# Patient Record
Sex: MALE | Race: WHITE | Employment: OTHER | URBAN - METROPOLITAN AREA
[De-identification: names, ages, dates, MRNs, and addresses within clinical notes are randomized per-mention and may not be internally consistent; named-entity substitution may affect disease eponyms.]

---

## 2022-02-16 ENCOUNTER — OFFICE VISIT (OUTPATIENT)
Dept: ORTHOPEDIC SURGERY | Age: 72
End: 2022-02-16
Payer: MEDICARE

## 2022-02-16 VITALS
WEIGHT: 232 LBS | RESPIRATION RATE: 16 BRPM | BODY MASS INDEX: 31.42 KG/M2 | OXYGEN SATURATION: 97 % | HEIGHT: 72 IN | HEART RATE: 75 BPM

## 2022-02-16 DIAGNOSIS — M72.2 PLANTAR FASCIITIS: Primary | ICD-10-CM

## 2022-02-16 PROCEDURE — 99203 OFFICE O/P NEW LOW 30 MIN: CPT | Performed by: STUDENT IN AN ORGANIZED HEALTH CARE EDUCATION/TRAINING PROGRAM

## 2022-02-16 ASSESSMENT — ENCOUNTER SYMPTOMS
VOMITING: 0
DIARRHEA: 0
SHORTNESS OF BREATH: 0
SORE THROAT: 0
COLOR CHANGE: 0
WHEEZING: 0
BACK PAIN: 0
NAUSEA: 0
COUGH: 0

## 2022-02-16 NOTE — PROGRESS NOTES
Patient is a 70year old male. Patient is in the office today with right foot/heel pain. Pain scale  1-2/10 currently but pain increases to 5/10 with walking. His pain is located on the medial arch into the medial side of his heel. He has tried shoe inserts to help with the pain but doesn't think it helped. He denies any falls or injuries but states that he was walking 2 miles about 5 days a week. He started having pain about 1 month ago. He states that he previously has plantar fasciitis about 20 years ago. He denies any previous surgeries or injections.    Occupation: retired

## 2022-02-16 NOTE — PATIENT INSTRUCTIONS
Prescription for night splint given. Physical therapy ordered today. Home exercises also given  Continue to wt bear as tolerated. Follow up in 2 months     Plantar Fasciitis: Exercises  Introduction  Here are some examples of exercises for you to try. The exercises may be suggested for a condition or for rehabilitation. Start each exercise slowly. Ease off the exercises if you start to have pain. You will be told when to start these exercises and which ones will work best for you. How to do the exercises  Towel stretch    1. Sit with your legs extended and knees straight. 2. Place a towel around your foot just under the toes. 3. Hold each end of the towel in each hand, with your hands above your knees. 4. Pull back with the towel so that your foot stretches toward you. 5. Hold the position for at least 15 to 30 seconds. 6. Repeat 2 to 4 times a session, up to 5 sessions a day. Calf stretch    This exercise stretches the muscles at the back of the lower leg (the calf) and the Achilles tendon. Do this exercise 3 or 4 times a day, 5 days a week. 1. Stand facing a wall with your hands on the wall at about eye level. Put the leg you want to stretch about a step behind your other leg. 2. Keeping your back heel on the floor, bend your front knee until you feel a stretch in the back leg. 3. Hold the stretch for 15 to 30 seconds. Repeat 2 to 4 times. Plantar fascia and calf stretch    Stretching the plantar fascia and calf muscles can increase flexibility and decrease heel pain. You can do this exercise several times each day and before and after activity. 1. Stand on a step as shown above. Be sure to hold on to the banister. 2. Slowly let your heels down over the edge of the step as you relax your calf muscles. You should feel a gentle stretch across the bottom of your foot and up the back of your leg to your knee.   3. Hold the stretch about 15 to 30 seconds, and then tighten your calf muscle a little to bring your heel back up to the level of the step. Repeat 2 to 4 times. Towel curls    Make this exercise more challenging by placing a weighted object, such as a soup can, on the other end of the towel. 1. While sitting, place your foot on a towel on the floor and scrunch the towel toward you with your toes. 2. Then, also using your toes, push the towel away from you. [place] pickups    1. Put marbles on the floor next to a cup.  2. Using your toes, try to lift the marbles up from the floor and put them in the cup. Follow-up care is a key part of your treatment and safety. Be sure to make and go to all appointments, and call your doctor if you are having problems. It's also a good idea to know your test results and keep a list of the medicines you take. Where can you learn more? Go to [email or web address] and sign in to your [employer] account. Enter C862 in the [employer] box to learn more about \"Plantar Fasciitis: Exercises. \"     If you do not have an account, please click on the \"Sign Up Now\" link. Current as of: July 1, 2021               Content Version: 13.1  © 2006-2021 Healthwise, Incorporated. Care instructions adapted under license by [facility] ([facility]). If you have questions about a medical condition or this instruction, always ask your healthcare professional. [place] [address] any warranty or liability for your use of this information.

## 2022-02-16 NOTE — PROGRESS NOTES
2/16/2022   Chief Complaint   Patient presents with    Foot Pain     right        History of Present Illness:                             Hi Garcia is a 70 y.o. male  referred by PCP for evaluation and treatment of right foot pain. This is evaluated as a personal injury. Patient states that he does have a history of plantar fasciitis but this was 10 to 20 years ago. He states that this went away with a night splint but states that over the last several weeks he has noticed pain in the plantar fascia of his foot on the right only. He states the pain is worse in the morning with his first steps but does get better as he ambulates on it. He denies any specific injury. He states he is very active and walks several miles a day and multiple days a week. He has no additional complaints at this time including numbness or tingling. The pain's location is right foot plantar fascia. he describes the symptoms as aching, sharp and tight. Symptoms improve with stretching. Symptoms worsen prolonged period of time not weightbearing. Patient states he denies having sensation of instability, decreased ROM    Treatment to date has been ice, NSAID, stretching without significant relief. Patient denies prior injury to foot or ankle, denies numbness, tingling, fever, chills. Is affecting ADLs. Pain is 5/10 at it's worst.    Outside reports reviewed: none. Patient's medications, allergies, past medical, surgical, social and family histories were reviewed and updated as appropriate. MA HPI: Patient is a 70year old male. Patient is in the office today with right foot/heel pain. Pain scale  1-2/10 currently but pain increases to 5/10 with walking. His pain is located on the medial arch into the medial side of his heel. He has tried shoe inserts to help with the pain but doesn't think it helped. He denies any falls or injuries but states that he was walking 2 miles about 5 days a week.  He started having pain about 1 month ago. He states that he previously has plantar fasciitis about 20 years ago. He denies any previous surgeries or injections. Occupation: retired      Medical History  Patient's medications, allergies, past medical, surgical, social and family histories were reviewed and updated as appropriate.     Past Medical History:   Diagnosis Date    Allergic rhinitis     controlled w otc claritin    Allergy to environmental factors     \"use to take allergy shots none for the past 15 yrs'    Asthma     \"few years ago\"    Elevated blood pressure (not hypertension)     \"never been on b/p medication\"    Hearing loss of left ear     \"in the process of getting a hearing aide    History of kidney stones     \"had them twice in the past- last time over 10 yrs ago- passed them\"    Hx MRSA infection     2012    Hyperlipidemia     Left Achilles tendinitis     Nephrolithiasis     early 1980s, passed w/o surgery    S/P colonoscopy 1/2014    Dr Irma Villanueva- severe diverticulosis- recheck 10 yrs     Past Surgical History:   Procedure Laterality Date    ABSCESS DRAINAGE Right 10/12    Dr Paul Man- R thumb MRSA infection     Family History   Problem Relation Age of Onset    Kidney Disease Father         in his 25s    Early Death Father     Heart Disease Mother     Hypertension Mother     Asthma Maternal Uncle     Heart Disease Son         irreg heart beat     Social History     Socioeconomic History    Marital status:      Spouse name: Not on file    Number of children: Not on file    Years of education: Not on file    Highest education level: Not on file   Occupational History    Occupation: [employer]     Comment: as noted   Tobacco Use    Smoking status: Never Smoker    Smokeless tobacco: Never Used   Substance and Sexual Activity    Alcohol use: Yes     Comment: occ- average 2-3 bottles per week of beer    Drug use: No    Sexual activity: Not on file   Other Topics Concern    Not on file Social History Narrative    Not on file     Social Determinants of Health     Financial Resource Strain:     Difficulty of Paying Living Expenses: Not on file   Food Insecurity:     Worried About Running Out of Food in the Last Year: Not on file    Charo of Food in the Last Year: Not on file   Transportation Needs:     Lack of Transportation (Medical): Not on file    Lack of Transportation (Non-Medical): Not on file   Physical Activity:     Days of Exercise per Week: Not on file    Minutes of Exercise per Session: Not on file   Stress:     Feeling of Stress : Not on file   Social Connections:     Frequency of Communication with Friends and Family: Not on file    Frequency of Social Gatherings with Friends and Family: Not on file    Attends Cheondoism Services: Not on file    Active Member of [address] [place] [address] [employer] or Organizations: Not on file    Attends Club or Organization Meetings: Not on file    Marital Status: Not on file   Intimate Partner Violence:     Fear of Current or Ex-Partner: Not on file    Emotionally Abused: Not on file    Physically Abused: Not on file    Sexually Abused: Not on file   Housing Stability:     Unable to Pay for Housing in the Last Year: Not on file    Number of [place] in the Last Year: Not on file    Unstable Housing in the Last Year: Not on file     Current Outpatient Medications   Medication Sig Dispense Refill    naproxen (NAPROSYN) 500 MG tablet Take 1 tablet by mouth 2 times daily (with meals). 60 tablet 1    atorvastatin (LIPITOR) 10 MG tablet TAKE 1 TABLET BY MOUTH ONE TIME A DAY  (Patient not taking: Reported on 2/16/2022) 30 tablet 5     No current facility-administered medications for this visit. Allergies   Allergen Reactions    Pollen Extract          Review of Systems   Constitutional: Positive for activity change. Negative for fatigue and fever. HENT: Negative for hearing loss, sneezing and sore throat.     Respiratory: Negative for cough, shortness of breath and wheezing. Cardiovascular: Negative for chest pain, palpitations and leg swelling. Gastrointestinal: Negative for diarrhea, nausea and vomiting. Musculoskeletal: Positive for gait problem and myalgias. Negative for arthralgias, back pain and joint swelling. Skin: Negative for color change, pallor, rash and wound. Neurological: Negative for weakness, numbness and headaches. Psychiatric/Behavioral: Negative for agitation and confusion. The patient is not hyperactive. Examination:  General Exam:  Vitals: Pulse 75   Resp 16   Ht 6' (1.829 m)   Wt 232 lb (105.2 kg)   SpO2 97%   BMI 31.46 kg/m²    Physical Exam  Constitutional:       General: He is not in acute distress. Appearance: Normal appearance. He is not ill-appearing. Eyes:      General:         Right eye: No discharge. Left eye: No discharge. Extraocular Movements: Extraocular movements intact. Cardiovascular:      Rate and Rhythm: Normal rate. Pulmonary:      Effort: Pulmonary effort is normal. No respiratory distress. Breath sounds: No wheezing. Musculoskeletal:         General: Tenderness present. No swelling, deformity or signs of injury. Right knee: Normal.      Left knee: Normal.      Right lower leg: Normal. No edema. Left lower leg: Normal. No edema. Right ankle: Normal. No swelling, deformity, ecchymosis or lacerations. No tenderness. No lateral malleolus tenderness. Normal range of motion. Anterior drawer test negative. Normal pulse. Right Achilles Tendon: Normal.      Left ankle: Normal.      Left Achilles Tendon: Normal.      Right foot: Normal range of motion and normal capillary refill. Tenderness present. No swelling, deformity, bunion, foot drop, prominent metatarsal heads, laceration, bony tenderness or crepitus. Normal pulse. Left foot: Normal.   Skin:     General: Skin is warm and dry.       Capillary Refill: Capillary refill takes less than 2 seconds. Neurological:      General: No focal deficit present. Mental Status: He is alert and oriented to person, place, and time. Sensory: No sensory deficit. Coordination: Coordination normal.      Gait: Gait normal.   Psychiatric:         Mood and Affect: Mood normal.         Behavior: Behavior normal.        RIGHT Foot and Ankle Exam      INSPECTION: ALIGNMENT:    Gait: Minimally antalgic      Alignment:     Hindfoot: Normal       Midfoot: Normal     Forefoot: Normal    Scars: None   Color: Normal     Swelling: None    Atrophy: None Collective     Heel / Toe Walking: Minimal difficulty     Ankle-Hindfoot Alignment:    Good plantigrade (PG), well aligned      TENDERNESS:    Sinus tarsi: None   Anteromedial joint line: none    Syndesmosis: none   Anterolateral joint line: none    ATFL: Negative   PTFL: Negative      CFL: Negative       Talonavicular: none     Anterior tibialis: none    Anterolateral gutter: none  Extensor tendons: none    Fibula: none    Peroneal tendons: none     Peroneal tubercle.  None     Medial/lateral achilles: none    Medial/lateral achilles insertion: none        Deltoid: none        Medial Malleolus: none   Retrocalcaneal: none    PTT: none    Medial achilles: none    Navicular: none   Lateral achilles: none    Calcaneal tuberosity: none        Calcaneal cuboid: none  MT / MT heads: none     Navicular: none   Medial cord origin PF: none    Cuneiforms: none   Web space: none    Lisfranc none        Base of the fifth metatarsal: none     Plantar fascia: Pain with palpation at calcaneus insertion as well as in the arch        RANGE OF MOTION:  RIGHT   LEFT    STRENGTH: (Right) (* = pain)     Ankle DF/PF:    15/45   15/45    Anterior tibialis: 5/5    Eversion/Inversion:   15/25   15/25   Posterior tibialis: 5/5    Midfoot ABD/ADD:   10/10   10/10   Gastroc-soleus: 5/5    First MTP DF/PF:   60/25 60/25   Peroneals: 5/5    EHL: 5/5            FHL: 5/5      NEUROLOGIC TESTING:    All dermatomes foot, ankle and leg have normal sensation light touch    Ankle Reflexes 2+, symmetric     Negative Babinski and No Clonus      VASCULAR:  2+ pulses PT/DT with brisk capillary refill toes. Diagnostic testing:  X-ray images were reviewed by myself and discussed with the patient:  3 views of the right foot and ankle in a skeletally immature patient demonstrates chronic appearing changes at the plantar fascia and Achilles tendons insertion at the calcaneus. There is calcification at the soft tissue insertions both with osteophyte formation. Minimal osteoarthritic changes throughout the foot and ankle. No sign of any acute osseous or soft tissue abnormalities. Office Procedures:  No orders of the defined types were placed in this encounter. Assessment and Plan    A: Right plantar fasciitis    P:   I had a thorough conversation with the patient regarding his right foot pain. I explained that he has plantar fasciitis like he had before and at this time we will continue conservative measures as he did well with these before. He was given a referral to physical therapy as he would like to be aggressive with this. He was also given home exercise program.  He was given a prescription for a night splint as well. He will continue to use ice and NSAIDs for pain control. He will follow-up in 2 months for reevaluation. All questions were answered and patient voiced understanding.       Electronically signed by Odell Ahumada, DO on 2/16/2022 at 2:52 PM

## 2022-02-24 ENCOUNTER — HOSPITAL ENCOUNTER (OUTPATIENT)
Dept: PHYSICAL THERAPY | Age: 72
Setting detail: THERAPIES SERIES
Discharge: HOME | End: 2022-02-24
Payer: MEDICARE

## 2022-02-24 PROCEDURE — 97161 PT EVAL LOW COMPLEX 20 MIN: CPT

## 2022-02-24 PROCEDURE — 97110 THERAPEUTIC EXERCISES: CPT

## 2022-02-24 ASSESSMENT — PAIN DESCRIPTION - PAIN TYPE: TYPE: ACUTE PAIN

## 2022-02-24 ASSESSMENT — PAIN SCALES - GENERAL: PAINLEVEL_OUTOF10: 1

## 2022-02-24 ASSESSMENT — PAIN DESCRIPTION - ORIENTATION: ORIENTATION: RIGHT

## 2022-02-24 ASSESSMENT — PAIN DESCRIPTION - LOCATION: LOCATION: FOOT

## 2022-02-24 ASSESSMENT — PAIN DESCRIPTION - DESCRIPTORS: DESCRIPTORS: DULL;SORE

## 2022-02-24 ASSESSMENT — PAIN - FUNCTIONAL ASSESSMENT: PAIN_FUNCTIONAL_ASSESSMENT: ACTIVITIES ARE NOT PREVENTED

## 2022-02-24 ASSESSMENT — PAIN DESCRIPTION - PROGRESSION: CLINICAL_PROGRESSION: GRADUALLY IMPROVING

## 2022-02-24 ASSESSMENT — PAIN DESCRIPTION - FREQUENCY: FREQUENCY: INTERMITTENT

## 2022-02-24 NOTE — FLOWSHEET NOTE
Outpatient Physical Therapy  [facility]           [x] Phone: [phone]   Fax: [phone]  [facility]           [] Phone: [phone]   Fax: [phone]        Physical Therapy Daily Treatment Note  Date:  2022    Patient Name:  Wilfredo Bhardwaj    :  1950  MRN: [id number]  Restrictions/Precautions: NONE  Diagnosis:   Diagnosis: plantar fasciitis  Date of Injury/Surgery: --  Treatment Diagnosis: Treatment Diagnosis: Decreased activity tolerance limited by R foot pain    Insurance/Certification information: [facility]   Referring Physician:  Referring Practitioner: Dr. Wilner Naidu  Next Doctor Visit:  --  Plan of care signed (Y/N):  N, sent 22  Outcome Measure: LEFS:  Visit# / total visits:   1/10  Pain level: 1/10   Goals:     Patient goals : improve pain with walking  Short term goals  Time Frame for Short term goals: 5 weeks  Short term goal 1: Pt demo I w/ HEP and symptom management  Short term goal 2: Pt demo LEFS score >50/80 to improve tolerance to ADL's and recreational activities  Short term goal 3: Pt demo >0 deg (neutral) dorsiflexion to improve calf mobility for stair management  Short term goal 4: Pt demo ability to complete >20 SL HR to improve ability to ambulate up/down hills  Short term goal 5: Pt reports R foot pain <2/10 in the morning when rising from bed to improve tolerance to gait activities       Summary of Evaluation:   Pt is 70year old male with gradual onset of R heel/foot pain without injury. Pt c.o of increased pain right when getting out of bed that improves with prolonged gait; states symptoms have slightly improved since initial onset. Pt demo deficits this date that include reduced foot/ankle mobility (most notable gastroc bias DF), limited foot/ankle strength and isolation of muscle groups, limited SL HR ability, tenderness along medial heal and medial longitudinal arch, limitations in great toe mobility.  Pt will benefit with PT services with calf stretching/strengthening, foot/ankle strengthening, balance/proprioception, modalities as needed for symptom management, STM/man to PF/achilles/calf to return to PLOF. Pt prior to onset of current condition had no pain with able to complete full ADLs and work activities. Patient received education on their current pathology and how their condition effects them with their functional activities. Patient understood discussion and questions were answered. Patient understands their activity limitations and understands rational for treatment progression. Subjective:  See reece         Any changes in Ambulatory Summary Sheet? None        Objective:  See eval   COVID screening questions were asked and patient attested that there had been no contact or symptoms        Exercises: (No more than 4 columns)   Exercise/Equipment 2/24/22 #1 Date Date           WARM UP      Nu Step               TABLE      *Seated Great Toe Stretch 20 sec     *TB Eversion                           STANDING      *Standing Calf Stretch w/ towel under R toes 30 sec     *ecc DL HR off of step      DL to SL ecc HR      Toe Walks       Shuttle Press                       PROPRIOCEPTION                                    MODALITIES                      Other Therapeutic Activities/Education:  Patient received education on their current pathology and how their condition effects them with their functional activities. Patient understood discussion and questions were answered. Patient understands their activity limitations and understands rational for treatment progression. Home Exercise Program:  HO issued, reviewed and discussed with patient. Pt agreed to comply.         Manual Treatments:  *try STM/IASTM next session to PF/achielles/calf       Modalities:  --      Communication with other providers:  reece sent 2/24/22      Assessment:  (Response towards treatment session) (Pain Rating)  Pt is 70year old male with gradual onset of R heel/foot pain without injury. Pt c.o of increased pain right when getting out of bed that improves with prolonged gait; states symptoms have slightly improved since initial onset. Pt demo deficits this date that include reduced foot/ankle mobility (most notable gastroc bias DF), limited foot/ankle strength and isolation of muscle groups, limited SL HR ability, tenderness along medial heal and medial longitudinal arch, limitations in great toe mobility. Pt will benefit with PT services with calf stretching/strengthening, foot/ankle strengthening, balance/proprioception, modalities as needed for symptom management, STM/man to PF/achilles/calf to return to PLOF. Pt prior to onset of current condition had no pain with able to complete full ADLs and work activities. Patient received education on their current pathology and how their condition effects them with their functional activities. Patient understood discussion and questions were answered. Patient understands their activity limitations and understands rational for treatment progression.           Plan for Next Session:        Time In / Time Out:   8348-5143        If BW Please Indicate Time In/Out/Total Time  CPT Code Time in Time out Total Time                                                            Total for session             Timed Code/Total Treatment Minutes:  39'  (1) PT amadeoal  (1) TE 10'      Next Progress Note due:  10th visit      Plan of Care Interventions:  [x] Therapeutic Exercise  [] Modalities:  [x] Therapeutic Activity     [] Ultrasound  [] Estim  [] Gait Training      [] Cervical Traction [] Lumbar Traction  [x] Neuromuscular Re-education    [] Cold/hotpack [] Iontophoresis   [x] Instruction in HEP      [x] Vasopneumatic   [] Dry Needling    [x] Manual Therapy               [] Aquatic Therapy              Electronically signed by:  Elisa Ganser, PT, DPT, CSCS 2/24/2022, 10:08 AM

## 2022-02-24 NOTE — PLAN OF CARE
Outpatient Physical Therapy           [place]           [] Phone: [phone]   Fax: [phone]  [place]           [] Phone: [phone]   Fax: [phone]     To: Referring Practitioner: Dr. Crispin Schaefer   From: Omid Sanchez, PT     Patient: Rose Henderson       : 1950  Diagnosis: Diagnosis: plantar fasciitis   Treatment Diagnosis: Treatment Diagnosis: Decreased activity tolerance limited by R foot pain   Date: 2022    Physical Therapy Certification/Re-Certification Form  Dear Dr. Crispin Schaefer,   The following patient has been evaluated for physical therapy services and for therapy to continue, insurance requires physician review of the treatment plan initially and every 90 days. Please review the attached evaluation and/or summary of the patient's plan of care, and verify that you agree therapy should continue by signing the attached document and sending it back to our office. Assessment:  Pt is 70year old male with gradual onset of R heel/foot pain without injury. Pt c.o of increased pain right when getting out of bed that improves with prolonged gait; states symptoms have slightly improved since initial onset. Pt demo deficits this date that include reduced foot/ankle mobility (most notable gastroc bias DF), limited foot/ankle strength and isolation of muscle groups, limited SL HR ability, tenderness along medial heal and medial longitudinal arch, limitations in great toe mobility. Pt will benefit with PT services with calf stretching/strengthening, foot/ankle strengthening, balance/proprioception, modalities as needed for symptom management, STM/man to PF/achilles/calf to return to PLOF. Pt prior to onset of current condition had no pain with able to complete full ADLs and work activities. Patient received education on their current pathology and how their condition effects them with their functional activities. Patient understood discussion and questions were answered.  Patient understands their activity limitations and understands rational for treatment progression. Plan of Care/Treatment to date:  [x] Therapeutic Exercise  [x] Modalities:  [x] Therapeutic Activity     [] Ultrasound  [] Electrical Stimulation  [] Gait Training      [] Cervical Traction [] Lumbar Traction  [x] Neuromuscular Re-education    [] Cold/hotpack [] Iontophoresis   [x] Instruction in HEP      [x] Vasopneumatic    [] Dry Needling  [x] Manual Therapy               [] Aquatic Therapy       Other:          Frequency/Duration:  # Days per week: [] 1 day # Weeks: [] 1 week [] 5 weeks     [] 2 days   [] 2 weeks [] 6 weeks     [] 3 days   [] 3 weeks [] 7 weeks     [] 4 days   [] 4 weeks [] 8 weeks         [] 9 weeks [] 10 weeks         [] 11 weeks [] 12 weeks    Rehab Potential/Progress: [] Excellent [x] Good [] Fair  [] Poor     Goals:    Patient goals : improve pain with walking  Short term goals  Time Frame for Short term goals: 5 weeks  Short term goal 1: Pt demo I w/ HEP and symptom management  Short term goal 2: Pt demo LEFS score >50/80 to improve tolerance to ADL's and recreational activities  Short term goal 3: Pt demo >0 deg (neutral) dorsiflexion to improve calf mobility for stair management  Short term goal 4: Pt demo ability to complete >20 SL HR to improve ability to ambulate up/down hills  Short term goal 5: Pt reports R foot pain <2/10 in the morning when rising from bed to improve tolerance to gait activities         Electronically signed by:  Leandro Frankel, PT, DPT, [facility] 2/24/2022, 10:08 AM        If you have any questions or concerns, please don't hesitate to call.   Thank you for your referral.      Physician Signature:________________________________Date:_________ TIME: _____  By signing above, therapists plan is approved by physician

## 2022-02-24 NOTE — PROGRESS NOTES
Physical Therapy  Initial Assessment  Date: 2022  Patient Name: Luh Freeman  MRN: [id number]  : 1950     Treatment Diagnosis: Decreased activity tolerance limited by R foot pain    Subjective   General  Chart Reviewed: Yes  Patient assessed for rehabilitation services?: Yes  Referring Practitioner: Dr. Jane Renee  Diagnosis: plantar fasciitis  Follows Commands: Within Functional Limits  Subjective  Subjective: Patient reports his foot is feeling a lot better today. States it worse more initially, helps with walking. Does not hurt when walking. He reports noticing the discomfort about a month ago. He has tried stretches including standing calf stretch, step stretch. States he had the same thing about 25 years ago and used a boot/therapy. No injections. He walks 4-5 days a week with his wife, a couple of miles. He is retired. PMH: reports broken R ankle at 11 y/o with a cast  Pain Screening  Patient Currently in Pain: Yes  Pain Assessment  Pain Assessment: 0-10  Pain Level: 1  Patient's Stated Pain Goal: No pain  Pain Type: Acute pain  Pain Location: Foot  Pain Orientation: Right  Pain Descriptors: Dull; Sore  Pain Frequency: Intermittent  Clinical Progression: Gradually improving  Functional Pain Assessment: Activities are not prevented  Vital Signs  Patient Currently in Pain: Yes    Vision/Hearing  Vision  Vision: Within Functional Limits  Hearing  Hearing: Within functional limits    Orientation  Orientation  Overall Orientation Status: Within Normal Limits    Social/Functional History  Social/Functional History  ADL Assistance: Independent  Homemaking Assistance: Independent  Ambulation Assistance: Independent  Transfer Assistance: Independent  Active : Yes  Mode of Transportation: Car  Occupation: Retired    Objective  Observation/Palpation  Posture: Good  Palpation: Mild tenderness along medial longitudinal arch and medial heel  Observation: Patient ambulates without AD and no anatalgic gait noted  Body Mechanics: With long sit positioning, patient would frequently bend his knees due to likely discomfort and tightness of hamstring in the position  Edema: Noen present in foot/ankle    PROM RLE (degrees)  RLE PROM: WNL  AROM RLE (degrees)  RLE General AROM: R foot/ankle mobility:DF: 0 deg (knee ext), 10 deg past zero (knee flex)PF; 38 deg IN: 50 degEV: 15 deg  PROM LLE (degrees)  LLE PROM: WNL  AROM LLE (degrees)  LLE General AROM: L foot/ankle mobility:DF: 5 deg past zero (knee ext), 10 deg past zero (knee flex)PF; 45 degIN: 30 degEV: 10 deg  Joint Mobility  ROM RLE: WNL midtarsal and subtalar mobility; min limitatins with calcaneal mobility into EV/IN    Strength RLE  Comment: R Strength: WNL except 4-/5 eversion, 4/5 inversion  Strength LLE  Comment: L Strength: WNL except 4-/5 eversion, 4/5 inversion  Strength Other  Other: 30 sec STS: 18 times, no UE assist, no increase in foot/ankle pain     Additional Measures  Other: Able to complete x10 DL heel raises without difficulty, only able to complete 8-10 SL bilat before fatigue  Sensation  Overall Sensation Status: WNL    Assessment   Conditions Requiring Skilled Therapeutic Intervention  Body structures, Functions, Activity limitations: Decreased functional mobility ; Increased pain;Decreased ADL status; Decreased balance;Decreased ROM; Decreased strength;Decreased endurance  Pt is 70year old male with gradual onset of R heel/foot pain without injury. Pt c.o of increased pain right when getting out of bed that improves with prolonged gait; states symptoms have slightly improved since initial onset. Pt demo deficits this date that include reduced foot/ankle mobility (most notable gastroc bias DF), limited foot/ankle strength and isolation of muscle groups, limited SL HR ability, tenderness along medial heal and medial longitudinal arch, limitations in great toe mobility.  Pt will benefit with PT services with calf stretching/strengthening, foot/ankle strengthening, balance/proprioception, modalities as needed for symptom management, STM/man to PF/achilles/calf to return to PLOF. Pt prior to onset of current condition had no pain with able to complete full ADLs and work activities. Patient received education on their current pathology and how their condition effects them with their functional activities. Patient understood discussion and questions were answered. Patient understands their activity limitations and understands rational for treatment progression.    Treatment Diagnosis: Decreased activity tolerance limited by R foot pain  Prognosis: Good  Decision Making: Low Complexity  Barriers to Learning: None-prefers demo and written  REQUIRES PT FOLLOW UP: Yes  Activity Tolerance  Activity Tolerance: Patient Tolerated treatment well    Plan   Plan  Times per week: 2x  Plan weeks: 5 weeks  Current Treatment Recommendations: Strengthening,Neuromuscular Re-education,Home Exercise Program,ROM,Manual Therapy - Soft Tissue Mobilization,Balance Training,Endurance Training,Manual Therapy - Joint Manipulation,Pain Management    Goals  Short term goals  Time Frame for Short term goals: 5 weeks  Short term goal 1: Pt demo I w/ HEP and symptom management  Short term goal 2: Pt demo LEFS score >50/80 to improve tolerance to ADL's and recreational activities  Short term goal 3: Pt demo >0 deg (neutral) dorsiflexion to improve calf mobility for stair management  Short term goal 4: Pt demo ability to complete >20 SL HR to improve ability to ambulate up/down hills  Short term goal 5: Pt reports R foot pain <2/10 in the morning when rising from bed to improve tolerance to gait activities  Patient Goals   Patient goals : improve pain with walking    Yaw Gaxiola, PT, DPT, CSCS

## 2022-03-01 ENCOUNTER — HOSPITAL ENCOUNTER (OUTPATIENT)
Dept: PHYSICAL THERAPY | Age: 72
Setting detail: THERAPIES SERIES
Discharge: HOME | End: 2022-03-01
Payer: MEDICARE

## 2022-03-01 PROCEDURE — 97110 THERAPEUTIC EXERCISES: CPT

## 2022-03-01 PROCEDURE — 97140 MANUAL THERAPY 1/> REGIONS: CPT

## 2022-03-01 NOTE — FLOWSHEET NOTE
Outpatient Physical Therapy  [place]           [x] Phone: [phone]   Fax: [phone]  [place] [place]           [] Phone: [phone]   Fax: [phone]        Physical Therapy Daily Treatment Note  Date:  3/1/2022    Patient Name:  Zeyad January    :  1950  MRN: [id number]  Restrictions/Precautions: NONE  Diagnosis:   Diagnosis: plantar fasciitis  Date of Injury/Surgery: --  Treatment Diagnosis: Treatment Diagnosis: Decreased activity tolerance limited by R foot pain    Insurance/Certification information: [facility] Medicare   Referring Physician:  Referring Practitioner: Dr. Baron Torres  Next Doctor Visit:  --  Plan of care signed (Y/N):  N, sent 22  Outcome Measure: LEFS:  Visit# / total visits:   2/10   Pain level: 4/10  with ambulation (stepping on rock)  Goals:     Patient goals : improve pain with walking  Short term goals  Time Frame for Short term goals: 5 weeks  Short term goal 1: Pt demo I w/ HEP and symptom management  Short term goal 2: Pt demo LEFS score >50/80 to improve tolerance to ADL's and recreational activities  Short term goal 3: Pt demo >0 deg (neutral) dorsiflexion to improve calf mobility for stair management  Short term goal 4: Pt demo ability to complete >20 SL HR to improve ability to ambulate up/down hills  Short term goal 5: Pt reports R foot pain <2/10 in the morning when rising from bed to improve tolerance to gait activities       Summary of Evaluation:   Pt is 70year old male with gradual onset of R heel/foot pain without injury. Pt c.o of increased pain right when getting out of bed that improves with prolonged gait; states symptoms have slightly improved since initial onset. Pt demo deficits this date that include reduced foot/ankle mobility (most notable gastroc bias DF), limited foot/ankle strength and isolation of muscle groups, limited SL HR ability, tenderness along medial heal and medial longitudinal arch, limitations in great toe mobility.  Pt will benefit with PT services with calf stretching/strengthening, foot/ankle strengthening, balance/proprioception, modalities as needed for symptom management, STM/man to PF/achilles/calf to return to PLOF. Pt prior to onset of current condition had no pain with able to complete full ADLs and work activities. Patient received education on their current pathology and how their condition effects them with their functional activities. Patient understood discussion and questions were answered. Patient understands their activity limitations and understands rational for treatment progression. Subjective:  Pt stated he feels it hurt most after sitting for a while. It will ease up a bit after walking. Any changes in Ambulatory Summary Sheet? None        Objective:  See eval   COVID screening questions were asked and patient attested that there had been no contact or symptoms     Cued for technique and posture    Exercises: (No more than 4 columns)   Exercise/Equipment 2/24/22 #1 3/1/22 #2 Date           WARM UP      Nu Step     lv 6 5'          TABLE      *Seated Great Toe Stretch 20 sec 20 s x 3    *TB Eversion  RTB x 20 5\"                         STANDING      *Standing Calf Stretch w/ towel under R toes 30 sec 30 sec    *ecc DL HR off of step  X 15    DL to SL ecc HR  X 15    Toe Walks   30 ft    Shuttle Press  2 x 10 ea. 4 cords DL  2 cords RLE                     PROPRIOCEPTION                                    MODALITIES                      Other Therapeutic Activities/Education:  Patient received education on their current pathology and how their condition effects them with their functional activities. Patient understood discussion and questions were answered. Patient understands their activity limitations and understands rational for treatment progression. Home Exercise Program:  HO issued, reviewed and discussed with patient. Pt agreed to comply.         Manual Treatments:  STM/IASTM plantar medial surface of foot. Modalities:  --      Communication with other providers:  amadeoal sent 2/24/22      Assessment:  (Response towards treatment session) (Pain Rating)  Pt demonstrated good tolerance to treatment and added exercises. Some tissue restrictions medial plantar surface noted. Pt would continue to benefit from skilled therapy interventions to address remaining impairments, improve mobility and strength and progress toward goal completion while reducing risk for re-injury or further decline. 2/10 pain post tx    Pt is 70year old male with gradual onset of R heel/foot pain without injury. Pt c.o of increased pain right when getting out of bed that improves with prolonged gait; states symptoms have slightly improved since initial onset. Pt demo deficits this date that include reduced foot/ankle mobility (most notable gastroc bias DF), limited foot/ankle strength and isolation of muscle groups, limited SL HR ability, tenderness along medial heal and medial longitudinal arch, limitations in great toe mobility. Pt will benefit with PT services with calf stretching/strengthening, foot/ankle strengthening, balance/proprioception, modalities as needed for symptom management, STM/man to PF/achilles/calf to return to PLOF. Pt prior to onset of current condition had no pain with able to complete full ADLs and work activities. Patient received education on their current pathology and how their condition effects them with their functional activities. Patient understood discussion and questions were answered. Patient understands their activity limitations and understands rational for treatment progression.           Plan for Next Session:        Time In / Time Out:   4279-8823      Timed Code/Total Treatment Minutes:  42'/42' 2 TE 1 MT    Next Progress Note due:  10th visit      Plan of Care Interventions:  [x] Therapeutic Exercise  [] Modalities:  [x] Therapeutic Activity     [] Ultrasound  [] Estim  [] Gait Training      [] Cervical Traction [] Lumbar Traction  [x] Neuromuscular Re-education    [] Cold/hotpack [] Iontophoresis   [x] Instruction in HEP      [x] Vasopneumatic   [] Dry Needling    [x] Manual Therapy               [] Aquatic Therapy              Electronically signed by:  Amelia Barnard PTA, CLT 3/1/2022, 11:19 AM

## 2022-03-03 ENCOUNTER — HOSPITAL ENCOUNTER (OUTPATIENT)
Dept: PHYSICAL THERAPY | Age: 72
Setting detail: THERAPIES SERIES
Discharge: HOME | End: 2022-03-03
Payer: MEDICARE

## 2022-03-03 PROCEDURE — 97110 THERAPEUTIC EXERCISES: CPT

## 2022-03-03 PROCEDURE — 97140 MANUAL THERAPY 1/> REGIONS: CPT

## 2022-03-03 NOTE — FLOWSHEET NOTE
Outpatient Physical Therapy  [place]           [x] Phone: [phone]   Fax: [phone]  [place] [facility]           [] Phone: [phone]   Fax: [phone]        Physical Therapy Daily Treatment Note  Date:  3/3/2022    Patient Name:  Breana Cheung    :  1950  MRN: [id number]  Restrictions/Precautions: NONE  Diagnosis:   Diagnosis: plantar fasciitis  Date of Injury/Surgery: --  Treatment Diagnosis: Treatment Diagnosis: Decreased activity tolerance limited by R foot pain    Insurance/Certification information: [facility]   Referring Physician:  Referring Practitioner: Dr. Kar Hyde  Next Doctor Visit:  --  Plan of care signed (Y/N):  YES, sent 22  Outcome Measure: LEFS: see folder  Visit# / total visits:   3/10   Pain level: 3/10  Goals:     Patient goals : improve pain with walking  Short term goals  Time Frame for Short term goals: 5 weeks  Short term goal 1: Pt demo I w/ HEP and symptom management  Short term goal 2: Pt demo LEFS score >50/80 to improve tolerance to ADL's and recreational activities  Short term goal 3: Pt demo >0 deg (neutral) dorsiflexion to improve calf mobility for stair management  Short term goal 4: Pt demo ability to complete >20 SL HR to improve ability to ambulate up/down hills  Short term goal 5: Pt reports R foot pain <2/10 in the morning when rising from bed to improve tolerance to gait activities      Summary of Evaluation:   Pt is 70year old male with gradual onset of R heel/foot pain without injury. Pt c.o of increased pain right when getting out of bed that improves with prolonged gait; states symptoms have slightly improved since initial onset. Pt demo deficits this date that include reduced foot/ankle mobility (most notable gastroc bias DF), limited foot/ankle strength and isolation of muscle groups, limited SL HR ability, tenderness along medial heal and medial longitudinal arch, limitations in great toe mobility.  Pt will benefit with PT services with calf stretching/strengthening, foot/ankle strengthening, balance/proprioception, modalities as needed for symptom management, STM/man to PF/achilles/calf to return to PLOF. Pt prior to onset of current condition had no pain with able to complete full ADLs and work activities. Patient received education on their current pathology and how their condition effects them with their functional activities. Patient understood discussion and questions were answered. Patient understands their activity limitations and understands rational for treatment progression. Subjective:  Pt stated he feels it hurt most after sitting for a while. It will ease up a bit after walking. Any changes in Ambulatory Summary Sheet? None      Objective:  See eval   COVID screening questions were asked and patient attested that there had been no contact or symptoms     cued for proper toe positioning for calf stretch with half FR    Exercises: (No more than 4 columns)   Exercise/Equipment 2/24/22 #1 3/1/22 #2 3/3/22 #3           WARM UP      Nu Step     lv 6 5' Lv5 5'         TABLE      *Seated Great Toe Stretch 20 sec 20 s x 3 20s x3   *TB Eversion  RTB x 20 5\"                         STANDING      *Standing Calf Stretch w/ towel under R toes 30 sec 30 sec 30\" x2 half FR   *ecc DL HR off of step  X 15 2x10 at cybex hip abd machine   DL to SL ecc HR  X 15 2x10    Toe Walks   30 ft 30 ft x2 laps, cues for decreased speed and height   Shuttle Press  2 x 10 ea. 4 cords DL  2 cords RLE 2x10 RLE only 2C   Shuttle SL HR   2x10 RLE only no weight    DL Post Tib HR       next   PROPRIOCEPTION                                    MODALITIES                      Other Therapeutic Activities/Education:  Patient received education on their current pathology and how their condition effects them with their functional activities. Patient understood discussion and questions were answered.  Patient understands their activity limitations and understands rational for treatment progression. Home Exercise Program:  HO issued, reviewed and discussed with patient. Pt agreed to comply. Manual Treatments:  STM/IASTM plantar medial surface of foot x15'      Modalities:  --      Communication with other providers:  amadeoal sent 2/24/22      Assessment: Pt demonstrated good tolerance to treatment and added exercises. Had some minimal tissue restrictions along medial longitudinal arch with good response following IASTM. Continue to progress calf strengthening and stretching as tolerated. 2/10 pain post tx    Pt is 70year old male with gradual onset of R heel/foot pain without injury. Pt c.o of increased pain right when getting out of bed that improves with prolonged gait; states symptoms have slightly improved since initial onset. Pt demo deficits this date that include reduced foot/ankle mobility (most notable gastroc bias DF), limited foot/ankle strength and isolation of muscle groups, limited SL HR ability, tenderness along medial heal and medial longitudinal arch, limitations in great toe mobility. Pt will benefit with PT services with calf stretching/strengthening, foot/ankle strengthening, balance/proprioception, modalities as needed for symptom management, STM/man to PF/achilles/calf to return to PLOF. Pt prior to onset of current condition had no pain with able to complete full ADLs and work activities. Patient received education on their current pathology and how their condition effects them with their functional activities. Patient understood discussion and questions were answered. Patient understands their activity limitations and understands rational for treatment progression.        Plan for Next Session: --      Time In / Time Out:   0586-1350       Timed Code/Total Treatment Minutes:  45'/45' 2 TE 1 MT 15'    Next Progress Note due:  10th visit      Plan of Care Interventions:  [x] Therapeutic Exercise  [] Modalities:  [x] Therapeutic Activity     [] Ultrasound  [] Estim  [] Gait Training      [] Cervical Traction [] Lumbar Traction  [x] Neuromuscular Re-education    [] Cold/hotpack [] Iontophoresis   [x] Instruction in HEP      [x] Vasopneumatic   [] Dry Needling    [x] Manual Therapy               [] Aquatic Therapy              Electronically signed by:  Jacquie Buckner PT, DPT, CSCS 3/3/2022, 6:28 AM

## 2022-03-08 ENCOUNTER — HOSPITAL ENCOUNTER (OUTPATIENT)
Dept: PHYSICAL THERAPY | Age: 72
Setting detail: THERAPIES SERIES
Discharge: HOME | End: 2022-03-08
Payer: MEDICARE

## 2022-03-08 PROCEDURE — 97140 MANUAL THERAPY 1/> REGIONS: CPT

## 2022-03-08 PROCEDURE — 97110 THERAPEUTIC EXERCISES: CPT

## 2022-03-08 NOTE — FLOWSHEET NOTE
Outpatient Physical Therapy  [facility]           [x] Phone: [phone]   Fax: [phone]  [place]           [] Phone: [phone]   Fax: [phone]        Physical Therapy Daily Treatment Note  Date:  3/8/2022    Patient Name:  Zeyad January    :  1950  MRN: [id number]  Restrictions/Precautions: NONE  Diagnosis:   Diagnosis: plantar fasciitis  Date of Injury/Surgery: --  Treatment Diagnosis: Treatment Diagnosis: Decreased activity tolerance limited by R foot pain    Insurance/Certification information: [facility] Medicare   Referring Physician:  Referring Practitioner: Dr. Baron Torres  Next Doctor Visit:  --  Plan of care signed (Y/N):  YES, sent 22  Outcome Measure: LEFS: see folder  Visit# / total visits:   4/10   Pain level: 2/10  Goals:     Patient goals : improve pain with walking  Short term goals  Time Frame for Short term goals: 5 weeks  Short term goal 1: Pt demo I w/ HEP and symptom management  Short term goal 2: Pt demo LEFS score >50/80 to improve tolerance to ADL's and recreational activities  Short term goal 3: Pt demo >0 deg (neutral) dorsiflexion to improve calf mobility for stair management  Short term goal 4: Pt demo ability to complete >20 SL HR to improve ability to ambulate up/down hills  Short term goal 5: Pt reports R foot pain <2/10 in the morning when rising from bed to improve tolerance to gait activities    Summary of Evaluation:   Pt is 70year old male with gradual onset of R heel/foot pain without injury. Pt c.o of increased pain right when getting out of bed that improves with prolonged gait; states symptoms have slightly improved since initial onset. Pt demo deficits this date that include reduced foot/ankle mobility (most notable gastroc bias DF), limited foot/ankle strength and isolation of muscle groups, limited SL HR ability, tenderness along medial heal and medial longitudinal arch, limitations in great toe mobility.  Pt will benefit with PT services with calf stretching/strengthening, foot/ankle strengthening, balance/proprioception, modalities as needed for symptom management, STM/man to PF/achilles/calf to return to PLOF. Pt prior to onset of current condition had no pain with able to complete full ADLs and work activities. Patient received education on their current pathology and how their condition effects them with their functional activities. Patient understood discussion and questions were answered. Patient understands their activity limitations and understands rational for treatment progression. Subjective:  Diana Carbajal reports 2/10 in his foot today. States it is improving, but continues to have some discomfort with initially walking. Any changes in Ambulatory Summary Sheet? None      Objective:  See eval   COVID screening questions were asked and patient attested that there had been no contact or symptoms     cued for proper toe positioning for calf stretch with half FR    Exercises: (No more than 4 columns)   Exercise/Equipment 2/24/22 #1 3/1/22 #2 3/3/22 #3 3/8/22 #4            WARM UP       Nu Step     lv 6 5' Lv5 5' Lv5 5'          TABLE       *Seated Great Toe Stretch 20 sec 20 s x 3 20s x3    *TB Eversion  RTB x 20 5\"                             STANDING       *Standing Calf Stretch w/ towel under R toes 30 sec 30 sec 30\" x2 half FR --   *ecc DL HR off of step  X 15 2x10 at cybex hip abd machine 2x10 at cybex    DL to SL ecc HR  X 15 2x10  2x10   Toe Walks   30 ft 30 ft x2 laps, cues for decreased speed and height --   Shuttle Press  2 x 10 ea.   4 cords DL  2 cords RLE 2x10 RLE only 2C 2x10 RLE only 2C   Shuttle SL HR   2x10 RLE only no weight 2x10 RLE only no weight    DL Post Tib HR       next 2x10 small red ball                  PROPRIOCEPTION       SL Balance    30\" x2 RLE only                                MODALITIES                         Other Therapeutic Activities/Education:  Patient received education on their current pathology and how their condition effects them with their functional activities. Patient understood discussion and questions were answered. Patient understands their activity limitations and understands rational for treatment progression. Home Exercise Program:  HO issued, reviewed and discussed with patient. Pt agreed to comply. Manual Treatments:  STM/IASTM plantar medial surface of foot x10'      Modalities:  --      Communication with other providers:  amadeoal sent 2/24/22      Assessment: Sarthak Monsalve demonstrated good tolerance to today's session. Noting improved tolerance with standing activities. Will continue to progress calf strengthening and balance/proprioceptive activities as appropriate. End of session: 2/10    Pt is 70year old male with gradual onset of R heel/foot pain without injury. Pt c.o of increased pain right when getting out of bed that improves with prolonged gait; states symptoms have slightly improved since initial onset. Pt demo deficits this date that include reduced foot/ankle mobility (most notable gastroc bias DF), limited foot/ankle strength and isolation of muscle groups, limited SL HR ability, tenderness along medial heal and medial longitudinal arch, limitations in great toe mobility. Pt will benefit with PT services with calf stretching/strengthening, foot/ankle strengthening, balance/proprioception, modalities as needed for symptom management, STM/man to PF/achilles/calf to return to PLOF. Pt prior to onset of current condition had no pain with able to complete full ADLs and work activities. Patient received education on their current pathology and how their condition effects them with their functional activities. Patient understood discussion and questions were answered. Patient understands their activity limitations and understands rational for treatment progression.        Plan for Next Session: --      Time In / Time Out:   3941-5014      Timed Code/Total Treatment Minutes:  28'    1 TE 1 MAN 10'    Next Progress Note due:  10th visit      Plan of Care Interventions:  [x] Therapeutic Exercise  [] Modalities:  [x] Therapeutic Activity     [] Ultrasound  [] Estim  [] Gait Training      [] Cervical Traction [] Lumbar Traction  [x] Neuromuscular Re-education    [] Cold/hotpack [] Iontophoresis   [x] Instruction in HEP      [x] Vasopneumatic   [] Dry Needling    [x] Manual Therapy               [] Aquatic Therapy              Electronically signed by:  Thomas Echavarria, PT, DPT, CSCS 3/8/2022, 6:38 AM

## 2022-03-10 ENCOUNTER — HOSPITAL ENCOUNTER (OUTPATIENT)
Dept: PHYSICAL THERAPY | Age: 72
Setting detail: THERAPIES SERIES
Discharge: HOME | End: 2022-03-10
Payer: MEDICARE

## 2022-03-10 PROCEDURE — 97112 NEUROMUSCULAR REEDUCATION: CPT

## 2022-03-10 PROCEDURE — 97110 THERAPEUTIC EXERCISES: CPT

## 2022-03-10 NOTE — FLOWSHEET NOTE
Outpatient Physical Therapy  [place]           [x] Phone: [phone]   Fax: [phone]  [place] [place]           [] Phone: [phone]   Fax: [phone]        Physical Therapy Daily Treatment Note  Date:  3/10/2022    Patient Name:  Jessie Katz    :  1950  MRN: [id number]  Restrictions/Precautions: NONE  Diagnosis:   Diagnosis: plantar fasciitis  Date of Injury/Surgery: --  Treatment Diagnosis: Treatment Diagnosis: Decreased activity tolerance limited by R foot pain    Insurance/Certification information: [facility] Medicare   Referring Physician:  Referring Practitioner: Dr. Maribell Bah  Next Doctor Visit:  --  Plan of care signed (Y/N):  YES, sent 22  Outcome Measure: LEFS: see folder  Visit# / total visits:   5/10   Pain level: 3/10  Goals:     Patient goals : improve pain with walking  Short term goals  Time Frame for Short term goals: 5 weeks  Short term goal 1: Pt demo I w/ HEP and symptom management reports compliance   Short term goal 2: Pt demo LEFS score >50/80 to improve tolerance to ADL's and recreational activities  Short term goal 3: Pt demo >0 deg (neutral) dorsiflexion to improve calf mobility for stair management  Short term goal 4: Pt demo ability to complete >20 SL HR to improve ability to ambulate up/down hills  Short term goal 5: Pt reports R foot pain <2/10 in the morning when rising from bed to improve tolerance to gait activities    Summary of Evaluation:   Pt is 70year old male with gradual onset of R heel/foot pain without injury. Pt c.o of increased pain right when getting out of bed that improves with prolonged gait; states symptoms have slightly improved since initial onset. Pt demo deficits this date that include reduced foot/ankle mobility (most notable gastroc bias DF), limited foot/ankle strength and isolation of muscle groups, limited SL HR ability, tenderness along medial heal and medial longitudinal arch, limitations in great toe mobility.  Pt will benefit with PT services with calf stretching/strengthening, foot/ankle strengthening, balance/proprioception, modalities as needed for symptom management, STM/man to PF/achilles/calf to return to PLOF. Pt prior to onset of current condition had no pain with able to complete full ADLs and work activities. Patient received education on their current pathology and how their condition effects them with their functional activities. Patient understood discussion and questions were answered. Patient understands their activity limitations and understands rational for treatment progression. Subjective:  Abdiel Marie reports 3/10 in his foot today. Reports he still has some soreness from the massage during the last session. Any changes in Ambulatory Summary Sheet? None      Objective:  See eval   COVID screening questions were asked and patient attested that there had been no contact or symptoms        Exercises: (No more than 4 columns)   Exercise/Equipment 3/8/22 #4 3/10/11 #5          WARM UP     Nu Step    Lv5 5' Lv5 5'        TABLE     *Seated Great Toe Stretch     *TB Eversion                       STANDING     *Standing Calf Stretch w/ towel under R toes -- Half FR 30\" x2 bilat   *ecc DL HR off of step 2x10 at cybex  2x10 at cybex   DL to SL ecc HR 2x10 2x10   Toe Walks  --    Shuttle Press 2x10 RLE only 2C 2x10 2C RLE only    Shuttle SL HR 2x10 RLE only no weight  2x10 RLE only no weight   DL Post Tib HR     2x10 small red ball  2x10 small red ball   FM Walks   Fwd/bwd/lat 5 laps ea 23#        PROPRIOCEPTION     SL Balance 30\" x2 RLE only  30\" x2 airex   Tandem Stance  30\" x2 RLE behind airex                  MODALITIES                   Other Therapeutic Activities/Education:  Patient received education on their current pathology and how their condition effects them with their functional activities. Patient understood discussion and questions were answered.  Patient understands their activity limitations and understands rational for treatment progression. Home Exercise Program:  HO issued, reviewed and discussed with patient. Pt agreed to comply. Manual Treatments:        Modalities:  --      Communication with other providers:  eval sent 2/24/22      Assessment: Renee Mixon demonstrated good tolerance to today's session. Held on STM today due to some increased soreness from the last session. Progressed standing/WB activity and foot/ankle strengthening within pain tolerance. Decreased frequency to 1x per week. End of session: 2/10    Pt is 70year old male with gradual onset of R heel/foot pain without injury. Pt c.o of increased pain right when getting out of bed that improves with prolonged gait; states symptoms have slightly improved since initial onset. Pt demo deficits this date that include reduced foot/ankle mobility (most notable gastroc bias DF), limited foot/ankle strength and isolation of muscle groups, limited SL HR ability, tenderness along medial heal and medial longitudinal arch, limitations in great toe mobility. Pt will benefit with PT services with calf stretching/strengthening, foot/ankle strengthening, balance/proprioception, modalities as needed for symptom management, STM/man to PF/achilles/calf to return to PLOF. Pt prior to onset of current condition had no pain with able to complete full ADLs and work activities. Patient received education on their current pathology and how their condition effects them with their functional activities. Patient understood discussion and questions were answered. Patient understands their activity limitations and understands rational for treatment progression.        Plan for Next Session: --      Time In / Time Out:   0325-8302'      Timed Code/Total Treatment Minutes:  45'    2 TE    1 neuro    Next Progress Note due:  10th visit      Plan of Care Interventions:  [x] Therapeutic Exercise  [] Modalities:  [x] Therapeutic Activity     [] Ultrasound  [] Estim  [] Gait Training [] Cervical Traction [] Lumbar Traction  [x] Neuromuscular Re-education    [] Cold/hotpack [] Iontophoresis   [x] Instruction in HEP      [x] Vasopneumatic   [] Dry Needling    [x] Manual Therapy               [] Aquatic Therapy              Electronically signed by:  Francisco Carreno PT, DPT, CSCS 3/10/2022, 6:38 AM

## 2022-03-15 ENCOUNTER — HOSPITAL ENCOUNTER (OUTPATIENT)
Dept: PHYSICAL THERAPY | Age: 72
Setting detail: THERAPIES SERIES
Discharge: HOME | End: 2022-03-15
Payer: MEDICARE

## 2022-03-15 PROCEDURE — 97110 THERAPEUTIC EXERCISES: CPT

## 2022-03-15 PROCEDURE — 97112 NEUROMUSCULAR REEDUCATION: CPT

## 2022-03-15 NOTE — FLOWSHEET NOTE
Outpatient Physical Therapy  [place]           [x] Phone: [phone]   Fax: [phone]  [facility]           [] Phone: [phone]   Fax: [phone]        Physical Therapy Daily Treatment Note  Date:  3/15/2022    Patient Name:  Ailyn Clark    :  1950  MRN: [id number]  Restrictions/Precautions: NONE  Diagnosis:   Diagnosis: plantar fasciitis  Date of Injury/Surgery: --  Treatment Diagnosis: Treatment Diagnosis: Decreased activity tolerance limited by R foot pain    Insurance/Certification information: [facility] Medicare   Referring Physician:  Referring Practitioner: Dr. Dami Kelly  Next Doctor Visit:  --  Plan of care signed (Y/N):  YES, sent 22  Outcome Measure: LEFS: see folder  Visit# / total visits:   6/10   Pain level: 2-3/10  Goals:     Patient goals : improve pain with walking  Short term goals  Time Frame for Short term goals: 5 weeks  Short term goal 1: Pt demo I w/ HEP and symptom management reports compliance   Short term goal 2: Pt demo LEFS score >50/80 to improve tolerance to ADL's and recreational activities  Short term goal 3: Pt demo >0 deg (neutral) dorsiflexion to improve calf mobility for stair management  Short term goal 4: Pt demo ability to complete >20 SL HR to improve ability to ambulate up/down hills  Short term goal 5: Pt reports R foot pain <2/10 in the morning when rising from bed to improve tolerance to gait activities    Summary of Evaluation:   Pt is 70year old male with gradual onset of R heel/foot pain without injury. Pt c.o of increased pain right when getting out of bed that improves with prolonged gait; states symptoms have slightly improved since initial onset. Pt demo deficits this date that include reduced foot/ankle mobility (most notable gastroc bias DF), limited foot/ankle strength and isolation of muscle groups, limited SL HR ability, tenderness along medial heal and medial longitudinal arch, limitations in great toe mobility.  Pt will benefit with PT services with calf stretching/strengthening, foot/ankle strengthening, balance/proprioception, modalities as needed for symptom management, STM/man to PF/achilles/calf to return to PLOF. Pt prior to onset of current condition had no pain with able to complete full ADLs and work activities. Patient received education on their current pathology and how their condition effects them with their functional activities. Patient understood discussion and questions were answered. Patient understands their activity limitations and understands rational for treatment progression. Subjective:  Kingsley Benitez reports 2-3/10 in his foot today. States he had been doing yard work this past week without limitations and min soreness following. States his foot has improved in the last 3 weeks. Any changes in Ambulatory Summary Sheet?   None      Objective:  See eval   COVID screening questions were asked and patient attested that there had been no contact or symptoms    Mild \"stretching\" sensation with FM stepping to the left  Close supervision for balance activities     Exercises: (No more than 4 columns)   Exercise/Equipment 3/8/22 #4 3/10/11 #5 3/15/22 #6           WARM UP      Nu Step    Lv5 5' Lv5 5' Lv5 5'         TABLE      *Seated Great Toe Stretch      *TB Eversion                           STANDING      *Standing Calf Stretch w/ towel under R toes -- Half FR 30\" x2 bilat Half FR 30\" x2 bilat   *ecc DL HR off of step 2x10 at cybex  2x10 at cybex 2x10   DL to SL ecc HR 2x10 2x10 2x10   Toe Walks  --  --   Shuttle Press 2x10 RLE only 2C 2x10 2C RLE only  2x10 3C RLE only    Shuttle SL HR 2x10 RLE only no weight  2x10 RLE only no weight 2x10 1C RLE only    DL Post Tib HR     2x10 small red ball  2x10 small red ball 2x10 small ball between ankles   FM Walks   Fwd/bwd/lat 5 laps ea 23# Fwd/bwd/lat 5 laps ea 23#   Fwd Step Down   2x10 RLE only heel tap 4\"   PROPRIOCEPTION      SL Balance 30\" x2 RLE only  30\" x2 airex 30\" x2 airex RLE pathology and how their condition effects them with their functional activities. Patient understood discussion and questions were answered. Patient understands their activity limitations and understands rational for treatment progression.        Plan for Next Session: --      Time In / Time Out:   9008-7088      Timed Code/Total Treatment Minutes:  39'    2 TE    1 neuro    Next Progress Note due:  10th visit      Plan of Care Interventions:  [x] Therapeutic Exercise  [] Modalities:  [x] Therapeutic Activity     [] Ultrasound  [] Estim  [] Gait Training      [] Cervical Traction [] Lumbar Traction  [x] Neuromuscular Re-education    [] Cold/hotpack [] Iontophoresis   [x] Instruction in HEP      [x] Vasopneumatic   [] Dry Needling    [x] Manual Therapy               [] Aquatic Therapy              Electronically signed by:  Tram Roberts, PT, DPT, CSCS 3/15/2022, 6:33 AM

## 2022-03-22 ENCOUNTER — HOSPITAL ENCOUNTER (OUTPATIENT)
Dept: PHYSICAL THERAPY | Age: 72
Setting detail: THERAPIES SERIES
Discharge: HOME | End: 2022-03-22
Payer: MEDICARE

## 2022-03-22 PROCEDURE — 97110 THERAPEUTIC EXERCISES: CPT

## 2022-03-22 NOTE — DISCHARGE SUMMARY
Outpatient Physical Therapy           [place]           [] Phone: [phone]   Fax: [phone]  [facility] [place]           [] Phone: [phone]   Fax: [phone]      To: Dr. Alicia Hughes    From: Nakita Dodson, DEA    Patient: Meggan Awad     : 1950  Diagnosis:  Plantar fasciitis   Date: 3/22/2022  Treatment Diagnosis: decreased activity tolerance limited by R foot pain       []  Progress Note                [x]  Discharge Note    Evaluation Date:  22   Total Visits to date:  7  Cancels/No-shows to date:  0    Subjective:  Joe Valles reports his foot has been feeling better and better, especially over the last 2-3 days. He reports maybe 1-2/0 pain upon arrival today. He has not tried running yet, but discussed slow progression/return.        Plan of Care/Treatment to date:  [x] Therapeutic Exercise    [] Modalities:  [x] Therapeutic Activity     [] Ultrasound  [] Electrical Stimulation  [] Gait Training      [] Cervical Traction   [] Lumbar Traction  [x] Neuromuscular Re-education  [x] Cold/hotpack [] Iontophoresis  [x] Instruction in HEP      Other:  [x] Manual Therapy       []  Vasopneumatic  [] Aquatic Therapy       []   Dry Needle Therapy                      Objective/Significant Findings At Last Visit/Comments:    Palpation: Mild tenderness along medial longitudinal arch and medial heel  Observation: Patient ambulates without AD and no anatalgic gait noted     RLE PROM: WNL  RLE General AROM: R foot/ankle mobility:  DF: 5 deg past zero (knee ext), 10 deg past zero (knee flex)  PF: 42 deg   IN: 35 deg  EV: 15 deg  LLE PROM: WNL  LLE General AROM: L foot/ankle mobility:DF: 5 deg past zero (knee ext), 10 deg past zero (knee flex)PF; 45 degIN: 30 degEV: 10 deg    Joint Mobility ROM RLE: WNL midtarsal and subtalar mobility, calcaneal mobility     Strength RLE: WNL *no increased pain  Strength LLE: WNL     30 sec STS: 18 times, no UE assist, no increase in foot/ankle pain  Heel Raise: able to complete x25 SL HR on ea side, no increased pain reported   6MWT:    Overall Sensation Status: WNL    LEFS: 57/80    Assessment:   Sarthak Monsalve has completed 7 visits since the start of therapy on 2/24/22. He demonstrates good improvements with foot/ankle mobility, especially into DF, WNL foot/ankle strength, ability to complete SL HR without increased pain, WNL joint mobility of subtalar/midtarsal regions, and improvements in overall pain management. He has not bee wearing the night splint much anymore, but has been stretching prior to getting out of bed in the morning. Still has some discomfort with initial stepping, but does not limit him from daily activities or going to baseball games. At this time, patient has met all his goals and will discharged from therapy to continue his program independently.        Goal Status:  [x] Achieved [] Partially Achieved  [] Not Achieved     Changes to goals:    Patient goals : improve pain with walking  Short term goals  Time Frame for Short term goals: 5 weeks  Short term goal 1: Pt demo I w/ HEP and symptom management reports compliance   Short term goal 2: Pt demo LEFS score >50/80 to improve tolerance to ADL's and recreational activities MET  Short term goal 3: Pt demo >0 deg (neutral) dorsiflexion to improve calf mobility for stair management MET  Short term goal 4: Pt demo ability to complete >20 SL HR to improve ability to ambulate up/down hills MET  Short term goal 5: Pt reports R foot pain <2/10 in the morning when rising from bed to improve tolerance to gait activities MET      Frequency/Duration:  # Days per week: [] 1 day # Weeks: [] 1 week [] 4 weeks [] 8 weeks     [x] 2 days   [] 2 weeks [x] 5 weeks [] 10 weeks     [] 3 days   [] 3 weeks [] 6 weeks [] 12 weeks       Rehab Potential: [] Excellent [x] Good [] Fair  [] Poor       Patient Status: [] Continue per initial plan of Care     [x] Patient now discharged     [] Additional visits requested, Please re-certify for additional visits: If we are requesting more visits, we fully anticipate the patient's condition is expected to improve within the treatment timeframe we are requesting. Electronically signed by:  Berta Gerard PT, DPT, [facility] 3/22/2022, 6:35 AM    If you have any questions or concerns, please don't hesitate to call.   Thank you for your referral.    Physician Signature:______________________ Date:______ Time: ________  By signing above, therapists plan is approved by physician

## 2022-03-22 NOTE — FLOWSHEET NOTE
Outpatient Physical Therapy  [facility]           [x] Phone: [phone]   Fax: [phone]  [facility] dolores           [] Phone: [phone]   Fax: [phone]        Physical Therapy Daily Treatment Note  Date:  3/22/2022    Patient Name:  Rose Henderson    :  1950  MRN: [id number]  Restrictions/Precautions: NONE  Diagnosis:   Diagnosis: plantar fasciitis  Date of Injury/Surgery: --  Treatment Diagnosis: Treatment Diagnosis: Decreased activity tolerance limited by R foot pain    Insurance/Certification information: [facility] Medicare   Referring Physician:  Referring Practitioner: Dr. Crispin Schaefer  Next Doctor Visit:  --  Plan of care signed (Y/N):  YES, sent 22  Outcome Measure: LEFS: 57/80  Visit# / total visits:   7/10   Pain level: 1-2/10  Goals:     Patient goals : improve pain with walking  Short term goals  Time Frame for Short term goals: 5 weeks  Short term goal 1: Pt demo I w/ HEP and symptom management reports compliance   Short term goal 2: Pt demo LEFS score >50/80 to improve tolerance to ADL's and recreational activities MET  Short term goal 3: Pt demo >0 deg (neutral) dorsiflexion to improve calf mobility for stair management MET  Short term goal 4: Pt demo ability to complete >20 SL HR to improve ability to ambulate up/down hills MET  Short term goal 5: Pt reports R foot pain <2/10 in the morning when rising from bed to improve tolerance to gait activities MET    Summary of Evaluation:   Pt is 70year old male with gradual onset of R heel/foot pain without injury. Pt c.o of increased pain right when getting out of bed that improves with prolonged gait; states symptoms have slightly improved since initial onset. Pt demo deficits this date that include reduced foot/ankle mobility (most notable gastroc bias DF), limited foot/ankle strength and isolation of muscle groups, limited SL HR ability, tenderness along medial heal and medial longitudinal arch, limitations in great toe mobility.  Pt will benefit with PT services with calf stretching/strengthening, foot/ankle strengthening, balance/proprioception, modalities as needed for symptom management, STM/man to PF/achilles/calf to return to PLOF. Pt prior to onset of current condition had no pain with able to complete full ADLs and work activities. Patient received education on their current pathology and how their condition effects them with their functional activities. Patient understood discussion and questions were answered. Patient understands their activity limitations and understands rational for treatment progression. Subjective:  Jovanna Dennison reports his foot has been feeling better and better, especially over the last 2-3 days. He reports maybe 1-2/0 pain upon arrival today. He has not tried running yet, but discussed slow progression/return. Any changes in Ambulatory Summary Sheet?   None      Objective:  See eval   COVID screening questions were asked and patient attested that there had been no contact or symptoms    See DN    Exercises: (No more than 4 columns)   Exercise/Equipment 3/8/22 #4 3/10/11 #5 3/15/22 #6 3/22/22 #7            WARM UP       Nu Step    Lv5 5' Lv5 5' Lv5 5' Lv5 5'          TABLE       *Seated Great Toe Stretch       *TB Eversion                               STANDING       *Standing Calf Stretch w/ towel under R toes -- Half FR 30\" x2 bilat Half FR 30\" x2 bilat    *ecc DL HR off of step 2x10 at cybex  2x10 at cybex 2x10    DL to SL ecc HR 2x10 2x10 2x10    Toe Walks  --  --    Shuttle Press 2x10 RLE only 2C 2x10 2C RLE only  2x10 3C RLE only     Shuttle SL HR 2x10 RLE only no weight  2x10 RLE only no weight 2x10 1C RLE only     DL Post Tib HR     2x10 small red ball  2x10 small red ball 2x10 small ball between ankles    FM Walks   Fwd/bwd/lat 5 laps ea 23# Fwd/bwd/lat 5 laps ea 23#    Fwd Step Down   2x10 RLE only heel tap 4\"    PROPRIOCEPTION       SL Balance 30\" x2 RLE only  30\" x2 airex 30\" x2 airex RLE only     Tandem Stance  30\" x2 RLE behind airex --    Airex Marches   2x20 alt    Balance Beam   Fwd/bwd tandam 2 laps    Lateral w/ ball waist pass 2 laps    Lat w/ ball head pass 2 laps           MODALITIES                         Other Therapeutic Activities/Education:  Patient received education on their current pathology and how their condition effects them with their functional activities. Patient understood discussion and questions were answered. Patient understands their activity limitations and understands rational for treatment progression. Home Exercise Program:  HO issued, reviewed and discussed with patient. Pt agreed to comply. Manual Treatments:        Modalities:  --      Communication with other providers:  amadeoal sent 2/24/22      Assessment: Sandra Yu has completed 7 visits since the start of therapy on 2/24/22. He demonstrates good improvements with foot/ankle mobility, especially into DF, WNL foot/ankle strength, ability to complete SL HR without increased pain, WNL joint mobility of subtalar/midtarsal regions, and improvements in overall pain management. He has not bee wearing the night splint much anymore, but has been stretching prior to getting out of bed in the morning. Still has some discomfort with initial stepping, but does not limit him from daily activities or going to baseball games. At this time, patient has met all his goals and will discharged from therapy to continue his program independently.      End of session: 1/10    Pt is 70year old male with gradual onset of R heel/foot pain without injury. Pt c.o of increased pain right when getting out of bed that improves with prolonged gait; states symptoms have slightly improved since initial onset.  Pt demo deficits this date that include reduced foot/ankle mobility (most notable gastroc bias DF), limited foot/ankle strength and isolation of muscle groups, limited SL HR ability, tenderness along medial heal and medial longitudinal arch, limitations in great toe mobility. Pt will benefit with PT services with calf stretching/strengthening, foot/ankle strengthening, balance/proprioception, modalities as needed for symptom management, STM/man to PF/achilles/calf to return to PLOF. Pt prior to onset of current condition had no pain with able to complete full ADLs and work activities. Patient received education on their current pathology and how their condition effects them with their functional activities. Patient understood discussion and questions were answered. Patient understands their activity limitations and understands rational for treatment progression.        Plan for Next Session: --      Time In / Time Out:   8529-6088      Timed Code/Total Treatment Minutes:  27'    2 TE      Next Progress Note due:  10th visit      Plan of Care Interventions:  [x] Therapeutic Exercise  [] Modalities:  [x] Therapeutic Activity     [] Ultrasound  [] Estim  [] Gait Training      [] Cervical Traction [] Lumbar Traction  [x] Neuromuscular Re-education    [] Cold/hotpack [] Iontophoresis   [x] Instruction in HEP      [x] Vasopneumatic   [] Dry Needling    [x] Manual Therapy               [] Aquatic Therapy              Electronically signed by:  Wilian Saul, PT, DPT, CSCS 3/22/2022, 6:34 AM

## 2022-07-11 ENCOUNTER — HOSPITAL ENCOUNTER (INPATIENT)
Age: 72
LOS: 3 days | Discharge: HOME | DRG: 872 | End: 2022-07-14
Attending: STUDENT IN AN ORGANIZED HEALTH CARE EDUCATION/TRAINING PROGRAM | Admitting: STUDENT IN AN ORGANIZED HEALTH CARE EDUCATION/TRAINING PROGRAM
Payer: MEDICARE

## 2022-07-11 ENCOUNTER — APPOINTMENT (OUTPATIENT)
Dept: CT IMAGING | Age: 72
DRG: 872 | End: 2022-07-11
Attending: STUDENT IN AN ORGANIZED HEALTH CARE EDUCATION/TRAINING PROGRAM
Payer: MEDICARE

## 2022-07-11 DIAGNOSIS — R79.89 ABNORMAL LFTS: Primary | ICD-10-CM

## 2022-07-11 PROBLEM — L03.90 CELLULITIS: Status: ACTIVE | Noted: 2022-07-11

## 2022-07-11 PROBLEM — A41.9 SEPSIS (HCC): Status: ACTIVE | Noted: 2022-07-11

## 2022-07-11 LAB
D DIMER: 870 NG/ML(DDU)
LACTATE: 2.2 MMOL/L (ref 0.4–2)
PROCALCITONIN: 0.28
TROPONIN T: <0.01 NG/ML

## 2022-07-11 PROCEDURE — 80053 COMPREHEN METABOLIC PANEL: CPT

## 2022-07-11 PROCEDURE — 6360000002 HC RX W HCPCS: Performed by: NURSE PRACTITIONER

## 2022-07-11 PROCEDURE — 93005 ELECTROCARDIOGRAM TRACING: CPT | Performed by: NURSE PRACTITIONER

## 2022-07-11 PROCEDURE — 83605 ASSAY OF LACTIC ACID: CPT

## 2022-07-11 PROCEDURE — 70450 CT HEAD/BRAIN W/O DYE: CPT

## 2022-07-11 PROCEDURE — 84484 ASSAY OF TROPONIN QUANT: CPT

## 2022-07-11 PROCEDURE — 87040 BLOOD CULTURE FOR BACTERIA: CPT

## 2022-07-11 PROCEDURE — 6370000000 HC RX 637 (ALT 250 FOR IP): Performed by: NURSE PRACTITIONER

## 2022-07-11 PROCEDURE — 94761 N-INVAS EAR/PLS OXIMETRY MLT: CPT

## 2022-07-11 PROCEDURE — 85610 PROTHROMBIN TIME: CPT

## 2022-07-11 PROCEDURE — 2580000003 HC RX 258: Performed by: NURSE PRACTITIONER

## 2022-07-11 PROCEDURE — 85025 COMPLETE CBC W/AUTO DIFF WBC: CPT

## 2022-07-11 PROCEDURE — 84145 PROCALCITONIN (PCT): CPT

## 2022-07-11 PROCEDURE — 85379 FIBRIN DEGRADATION QUANT: CPT

## 2022-07-11 PROCEDURE — 2060000000 HC ICU INTERMEDIATE R&B

## 2022-07-11 PROCEDURE — 36415 COLL VENOUS BLD VENIPUNCTURE: CPT

## 2022-07-11 RX ORDER — ONDANSETRON 2 MG/ML
4 INJECTION INTRAMUSCULAR; INTRAVENOUS EVERY 6 HOURS PRN
Status: DISCONTINUED | OUTPATIENT
Start: 2022-07-11 | End: 2022-07-14 | Stop reason: HOSPADM

## 2022-07-11 RX ORDER — ACETAMINOPHEN 325 MG/1
650 TABLET ORAL EVERY 6 HOURS PRN
Status: DISCONTINUED | OUTPATIENT
Start: 2022-07-11 | End: 2022-07-14 | Stop reason: HOSPADM

## 2022-07-11 RX ORDER — SODIUM CHLORIDE 0.9 % (FLUSH) 0.9 %
5-40 SYRINGE (ML) INJECTION PRN
Status: DISCONTINUED | OUTPATIENT
Start: 2022-07-11 | End: 2022-07-14 | Stop reason: HOSPADM

## 2022-07-11 RX ORDER — SODIUM CHLORIDE 0.9 % (FLUSH) 0.9 %
5-40 SYRINGE (ML) INJECTION EVERY 12 HOURS SCHEDULED
Status: DISCONTINUED | OUTPATIENT
Start: 2022-07-11 | End: 2022-07-14 | Stop reason: HOSPADM

## 2022-07-11 RX ORDER — ENOXAPARIN SODIUM 100 MG/ML
30 INJECTION SUBCUTANEOUS 2 TIMES DAILY
Status: DISCONTINUED | OUTPATIENT
Start: 2022-07-11 | End: 2022-07-11

## 2022-07-11 RX ORDER — ACETAMINOPHEN 325 MG/1
650 TABLET ORAL EVERY 6 HOURS PRN
Status: DISCONTINUED | OUTPATIENT
Start: 2022-07-11 | End: 2022-07-11

## 2022-07-11 RX ORDER — PROMETHAZINE HYDROCHLORIDE 25 MG/1
12.5 TABLET ORAL EVERY 6 HOURS PRN
Status: DISCONTINUED | OUTPATIENT
Start: 2022-07-11 | End: 2022-07-14 | Stop reason: HOSPADM

## 2022-07-11 RX ORDER — CEPHALEXIN 500 MG/1
500 CAPSULE ORAL EVERY 6 HOURS
Status: ON HOLD | COMMUNITY
Start: 2022-07-08 | End: 2022-07-14 | Stop reason: HOSPADM

## 2022-07-11 RX ORDER — POLYETHYLENE GLYCOL 3350 17 G/17G
17 POWDER, FOR SOLUTION ORAL DAILY PRN
Status: DISCONTINUED | OUTPATIENT
Start: 2022-07-11 | End: 2022-07-13 | Stop reason: SDUPTHER

## 2022-07-11 RX ORDER — POLYETHYLENE GLYCOL 3350 17 G/17G
17 POWDER, FOR SOLUTION ORAL DAILY PRN
Status: DISCONTINUED | OUTPATIENT
Start: 2022-07-11 | End: 2022-07-14 | Stop reason: HOSPADM

## 2022-07-11 RX ORDER — FAMOTIDINE 20 MG/1
20 TABLET, FILM COATED ORAL 2 TIMES DAILY
Status: DISCONTINUED | OUTPATIENT
Start: 2022-07-11 | End: 2022-07-14 | Stop reason: HOSPADM

## 2022-07-11 RX ORDER — SODIUM CHLORIDE 9 MG/ML
INJECTION, SOLUTION INTRAVENOUS CONTINUOUS
Status: DISCONTINUED | OUTPATIENT
Start: 2022-07-11 | End: 2022-07-14 | Stop reason: HOSPADM

## 2022-07-11 RX ORDER — SODIUM CHLORIDE 9 MG/ML
INJECTION, SOLUTION INTRAVENOUS PRN
Status: DISCONTINUED | OUTPATIENT
Start: 2022-07-11 | End: 2022-07-14 | Stop reason: HOSPADM

## 2022-07-11 RX ORDER — ENOXAPARIN SODIUM 100 MG/ML
40 INJECTION SUBCUTANEOUS 2 TIMES DAILY
Status: DISCONTINUED | OUTPATIENT
Start: 2022-07-12 | End: 2022-07-14 | Stop reason: HOSPADM

## 2022-07-11 RX ORDER — ONDANSETRON 4 MG/1
4 TABLET, ORALLY DISINTEGRATING ORAL EVERY 8 HOURS PRN
Status: DISCONTINUED | OUTPATIENT
Start: 2022-07-11 | End: 2022-07-14 | Stop reason: HOSPADM

## 2022-07-11 RX ADMIN — PIPERACILLIN AND TAZOBACTAM 4500 MG: 4; .5 INJECTION, POWDER, FOR SOLUTION INTRAVENOUS at 17:43

## 2022-07-11 RX ADMIN — ONDANSETRON 4 MG: 2 INJECTION INTRAMUSCULAR; INTRAVENOUS at 20:55

## 2022-07-11 RX ADMIN — FAMOTIDINE 20 MG: 20 TABLET ORAL at 17:43

## 2022-07-11 RX ADMIN — VANCOMYCIN HYDROCHLORIDE 1500 MG: 1 INJECTION, POWDER, LYOPHILIZED, FOR SOLUTION INTRAVENOUS at 18:40

## 2022-07-11 RX ADMIN — SODIUM CHLORIDE: 9 INJECTION, SOLUTION INTRAVENOUS at 17:41

## 2022-07-11 RX ADMIN — PIPERACILLIN AND TAZOBACTAM 3375 MG: 3; .375 INJECTION, POWDER, LYOPHILIZED, FOR SOLUTION INTRAVENOUS at 20:53

## 2022-07-11 RX ADMIN — SODIUM CHLORIDE, PRESERVATIVE FREE 10 ML: 5 INJECTION INTRAVENOUS at 20:59

## 2022-07-11 ASSESSMENT — ENCOUNTER SYMPTOMS
SHORTNESS OF BREATH: 0
VOMITING: 0
CONSTIPATION: 0
EYES NEGATIVE: 1
NAUSEA: 1
SORE THROAT: 0
ROS SKIN COMMENTS: R KNEE PAIN
COUGH: 0
ABDOMINAL PAIN: 0

## 2022-07-11 ASSESSMENT — LIFESTYLE VARIABLES
HOW OFTEN DO YOU HAVE A DRINK CONTAINING ALCOHOL: NEVER
HOW OFTEN DO YOU HAVE A DRINK CONTAINING ALCOHOL: MONTHLY OR LESS

## 2022-07-11 ASSESSMENT — PAIN DESCRIPTION - LOCATION: LOCATION: KNEE

## 2022-07-11 NOTE — H&P
answered. Patient and family voiced understanding    This patient was seen and examined in conjunction with Dr. Silvino Gage. He/She was agreeable with the plan and management as dictated above. Disposition:   Expected Disposition: Home  Estimated D/C: 3 days    Diet Diet NPO Exceptions are: Sips of Water with Meds, Ice Chips, Sips of Clear Liquids   GI Prophylaxis  [] PPI,  [x] H2 Blocker,  [] Carafate,  [] Diet/Tube Feeds   DVT Prophylaxis [x] Lovenox, []  Heparin, [] SCDs, [] Ambulation,  [] NOAC   Code Status Full Code   Surrogate Decision Maker/ POA          History from:     patient, electronic medical record, patient's wife    History of Present Illness:     Chief Complaint: Sepsis ([address]  Shana Mccoy is a 67 y.o. male who denies past medical history who originally presented to [facility] ED on 7 8 with complaints of a fall with injury to the knee weeks ago. Reported increased pain and redness to the right knee. Denied any fevers, chills at that time. Patient was discharged home with prescription for antibiotics/Keflex. Wife reports patient had worsening of mental status over the weekend, states he was disoriented. Endorses an acute syncopal episode this morning. States he fell, slid onto an ottoman. Denies hitting head but endorses loss of consciousness. This prompted them to represent to the freestanding ED today for further evaluation. He was transferred to [facility] for further treatment. Wife reports that patient had chills all weekend but did not think temperature. Patient denies chest pain or shortness of breath. Denies abdominal pain. Endorses nausea. Denies vomiting. Endorses pain in knee with palpation and ambulation but states he is able to ambulate. Denies numbness or tingling to lower extremities. Additional review of symptoms as noted below  Review of Systems: Need 10 Elements   Review of Systems   Constitutional: Positive for chills. HENT: Negative for congestion and sore throat. Eyes: Negative. Respiratory: Negative for cough and shortness of breath. Cardiovascular: Negative for chest pain and leg swelling. Gastrointestinal: Positive for nausea. Negative for abdominal pain, constipation and vomiting. Endocrine: Negative. Genitourinary: Negative for dysuria and hematuria. Musculoskeletal: Positive for myalgias. Negative for neck pain. Skin: Positive for wound. R knee pain   Neurological: Positive for dizziness, syncope and light-headedness. Psychiatric/Behavioral: Positive for confusion. All other systems reviewed and are negative. Objective:   No intake or output data in the 24 hours ending 07/11/22 1328   Vitals:   Vitals:    07/11/22 1140   BP: 130/80   Pulse: 90   Resp: 20   Temp: (!) 101.6 °F (38.7 °C)   TempSrc: Oral   SpO2: 97%   Weight: 230 lb (104.3 kg)   Height: 6' 1\" (1.854 m)       Medications Prior to Admission     Prior to Admission medications    Medication Sig Start Date End Date Taking? Authorizing Provider   cephALEXin (KEFLEX) 500 MG capsule Take 500 mg by mouth every 6 hours 7/8/22 7/18/22 Yes Historical Provider, MD   atorvastatin (LIPITOR) 10 MG tablet TAKE 1 TABLET BY MOUTH ONE TIME A DAY  1/3/15   Babita Bah MD   naproxen (NAPROSYN) 500 MG tablet Take 1 tablet by mouth 2 times daily (with meals). 10/21/14   Babita Bah MD       Physical Exam: Need 8 Elements   Physical Exam  Vitals and nursing note reviewed. Constitutional:       General: He is not in acute distress. Appearance: Normal appearance. HENT:      Head: Normocephalic. Nose: Nose normal.      Mouth/Throat:      Pharynx: Oropharynx is clear. Eyes:      Pupils: Pupils are equal, round, and reactive to light. Cardiovascular:      Rate and Rhythm: Regular rhythm. Tachycardia present. Pulses:           Dorsalis pedis pulses are 1+ on the right side. Posterior tibial pulses are 1+ on the right side.    Pulmonary:      Effort: Pulmonary effort is normal. No respiratory distress. Breath sounds: No wheezing or rhonchi. Abdominal:      General: Abdomen is flat. Bowel sounds are normal. There is no distension. Musculoskeletal:         General: Normal range of motion. Cervical back: Normal range of motion. Right knee: Swelling and erythema present. Legs:    Skin:     General: Skin is warm and dry. Capillary Refill: Capillary refill takes less than 2 seconds. Neurological:      General: No focal deficit present. Mental Status: He is alert and oriented to person, place, and time. Psychiatric:         Mood and Affect: Mood normal.            Past Medical History:   PMHx   Past Medical History:   Diagnosis Date    Allergic rhinitis     controlled w otc claritin    Allergy to environmental factors     \"use to take allergy shots none for the past 15 yrs'    Asthma     \"few years ago\"    Elevated blood pressure (not hypertension)     \"never been on b/p medication\"    Hearing loss of left ear     \"in the process of getting a hearing aide    History of kidney stones     \"had them twice in the past- last time over 10 yrs ago- passed them\"    Hx MRSA infection     2012    Hyperlipidemia     Left Achilles tendinitis     Nephrolithiasis     early 1980s, passed w/o surgery    S/P colonoscopy 1/2014    Dr Karen Bell- severe diverticulosis- recheck 10 yrs     PSHX:  has a past surgical history that includes Abscess Drainage (Right, 10/12). Allergies: Allergies   Allergen Reactions    Pollen Extract Shortness Of Breath     Fam HX:  family history includes Asthma in his maternal uncle; Early Death in his father; Heart Disease in his mother and son; Hypertension in his mother; Kidney Disease in his father.   Soc HX:   Social History     Socioeconomic History    Marital status:      Spouse name: Sandy Byrne    Number of children: 1    Years of education: Not on file    Highest education level: Not on file   Occupational History    Occupation: [employer]     Comment: as noted   Tobacco Use    Smoking status: Never Smoker    Smokeless tobacco: Never Used   Substance and Sexual Activity    Alcohol use: Yes     Comment: occ- average 2-3 bottles per week of beer    Drug use: No    Sexual activity: Not Currently   Other Topics Concern    Not on file   Social History Narrative    Not on file     Social Determinants of Health     Financial Resource Strain:     Difficulty of Paying Living Expenses: Not on file   Food Insecurity:     Worried About Running Out of Food in the Last Year: Not on file    Charo of Food in the Last Year: Not on file   Transportation Needs:     Lack of Transportation (Medical): Not on file    Lack of Transportation (Non-Medical):  Not on file   Physical Activity:     Days of Exercise per Week: Not on file    Minutes of Exercise per Session: Not on file   Stress:     Feeling of Stress : Not on file   Social Connections:     Frequency of Communication with Friends and Family: Not on file    Frequency of Social Gatherings with Friends and Family: Not on file    Attends Sabianism Services: Not on file    Active Member of [address] [place] [address] or Organizations: Not on file    Attends Club or Organization Meetings: Not on file    Marital Status: Not on file   Intimate Partner Violence:     Fear of Current or Ex-Partner: Not on file    Emotionally Abused: Not on file    Physically Abused: Not on file    Sexually Abused: Not on file   Housing Stability:     Unable to Pay for Housing in the Last Year: Not on file    Number of [place] in the Last Year: Not on file    Unstable Housing in the Last Year: Not on file       Medications:   Medications:    sodium chloride flush  5-40 mL IntraVENous 2 times per day    vancomycin  15 mg/kg IntraVENous Q12H    piperacillin-tazobactam  4,500 mg IntraVENous Once    And    piperacillin-tazobactam  3,375 mg IntraVENous Q8H    sodium chloride flush 5-40 mL IntraVENous 2 times per day    famotidine  20 mg Oral BID    [START ON 7/12/2022] enoxaparin  40 mg SubCUTAneous BID      Infusions:    sodium chloride      sodium chloride      sodium chloride       PRN Meds: sodium chloride flush, 5-40 mL, PRN  sodium chloride, , PRN  polyethylene glycol, 17 g, Daily PRN  acetaminophen, 650 mg, Q6H PRN   Or  acetaminophen, 650 mg, Q6H PRN  promethazine, 12.5 mg, Q6H PRN   Or  ondansetron, 4 mg, Q6H PRN  sodium chloride flush, 5-40 mL, PRN  sodium chloride, , PRN  ondansetron, 4 mg, Q8H PRN   Or  ondansetron, 4 mg, Q6H PRN  polyethylene glycol, 17 g, Daily PRN        Labs      CBC: No results for input(s): WBC, HGB, PLT in the last 72 hours. BMP:  No results for input(s): NA, K, CL, CO2, BUN, CREATININE, GLUCOSE in the last 72 hours. Hepatic: No results for input(s): AST, ALT, ALB, BILITOT, ALKPHOS in the last 72 hours. Lipids:   Lab Results   Component Value Date/Time    CHOL 244 10/30/2014 04:36 PM    HDL 33 10/30/2014 04:36 PM    TRIG 173 10/30/2014 04:36 PM     Hemoglobin A1C: No results found for: LABA1C  TSH:   Lab Results   Component Value Date/Time    TSH 1.7 10/30/2014 04:36 PM     Troponin: No results found for: TROPONINT  Lactic Acid: No results for input(s): LACTA in the last 72 hours. BNP: No results for input(s): PROBNP in the last 72 hours. UA:No results found for: [place], PHUR, LABCAST, WBCUA, RBCUA, MUCUS, TRICHOMONAS, YEAST, BACTERIA, CLARITYU, SPECGRAV, LEUKOCYTESUR, UROBILINOGEN, BILIRUBINUR, BLOODU, GLUCOSEU, KETUA, AMORPHOUS  Urine Cultures: No results found for: LABURIN  Blood Cultures: No results found for: BC  No results found for: BLOODCULT2  Organism:   Lab Results   Component Value Date/Time    ORG MRSA 09/21/2012 08:15 AM       Imaging/Diagnostics Last 24 Hours   No results found.           Electronically signed by ORTIZ Beyer CNP on 7/11/2022 at 1:28 PM          This dictation was created with voice recognition software. While attempts have been made to review the dictation as it is transcribed, on occasion the spoken word can be misinterpreted by the technology leading to omissions or inappropriate words, phrases or sentences.      Electronically signed by ORTIZ Ray CNP on 7/11/2022 at 1:28 PM

## 2022-07-12 ENCOUNTER — APPOINTMENT (OUTPATIENT)
Dept: ULTRASOUND IMAGING | Age: 72
DRG: 872 | End: 2022-07-12
Attending: STUDENT IN AN ORGANIZED HEALTH CARE EDUCATION/TRAINING PROGRAM
Payer: MEDICARE

## 2022-07-12 ENCOUNTER — APPOINTMENT (OUTPATIENT)
Dept: CT IMAGING | Age: 72
DRG: 872 | End: 2022-07-12
Attending: STUDENT IN AN ORGANIZED HEALTH CARE EDUCATION/TRAINING PROGRAM
Payer: MEDICARE

## 2022-07-12 LAB
ALBUMIN SERPL-MCNC: 3.5 GM/DL (ref 3.4–5)
ALP BLD-CCNC: 57 IU/L (ref 40–129)
ALT SERPL-CCNC: 45 U/L (ref 10–40)
ANION GAP SERPL CALCULATED.3IONS-SCNC: 10 MMOL/L (ref 4–16)
AST SERPL-CCNC: 129 IU/L (ref 15–37)
BANDED NEUTROPHILS ABSOLUTE COUNT: 0.56 K/CU MM
BANDED NEUTROPHILS RELATIVE PERCENT: 9 % (ref 5–11)
BASOPHILS ABSOLUTE: 0.1 K/CU MM
BASOPHILS RELATIVE PERCENT: 2 % (ref 0–1)
BILIRUB SERPL-MCNC: 0.9 MG/DL (ref 0–1)
BUN BLDV-MCNC: 12 MG/DL (ref 6–23)
CALCIUM SERPL-MCNC: 7.9 MG/DL (ref 8.3–10.6)
CHLORIDE BLD-SCNC: 100 MMOL/L (ref 99–110)
CHOLESTEROL: 181 MG/DL
CO2: 25 MMOL/L (ref 21–32)
CREAT SERPL-MCNC: 1.2 MG/DL (ref 0.9–1.3)
DIFFERENTIAL TYPE: ABNORMAL
DOHLE BODIES: PRESENT
EKG ATRIAL RATE: 74 BPM
EKG DIAGNOSIS: NORMAL
EKG P AXIS: 11 DEGREES
EKG P-R INTERVAL: 202 MS
EKG Q-T INTERVAL: 390 MS
EKG QRS DURATION: 92 MS
EKG QTC CALCULATION (BAZETT): 432 MS
EKG R AXIS: 12 DEGREES
EKG T AXIS: 38 DEGREES
EKG VENTRICULAR RATE: 74 BPM
EOSINOPHILS ABSOLUTE: 0.9 K/CU MM
EOSINOPHILS RELATIVE PERCENT: 15 % (ref 0–3)
GFR AFRICAN AMERICAN: >60 ML/MIN/1.73M2
GFR NON-AFRICAN AMERICAN: 60 ML/MIN/1.73M2
GLUCOSE BLD-MCNC: 108 MG/DL (ref 70–99)
HCT VFR BLD CALC: 46.7 % (ref 42–52)
HDLC SERPL-MCNC: 19 MG/DL
HEMOGLOBIN: 15.4 GM/DL (ref 13.5–18)
LACTATE: 1.6 MMOL/L (ref 0.4–2)
LDL CHOLESTEROL CALCULATED: 126 MG/DL
LV EF: 58 %
LVEF MODALITY: NORMAL
LYMPHOCYTES ABSOLUTE: 0.6 K/CU MM
LYMPHOCYTES RELATIVE PERCENT: 10 % (ref 24–44)
MCH RBC QN AUTO: 29.1 PG (ref 27–31)
MCHC RBC AUTO-ENTMCNC: 33 % (ref 32–36)
MCV RBC AUTO: 88.3 FL (ref 78–100)
MONOCYTES ABSOLUTE: 0.5 K/CU MM
MONOCYTES RELATIVE PERCENT: 8 % (ref 0–4)
PDW BLD-RTO: 13 % (ref 11.7–14.9)
PLATELET # BLD: 150 K/CU MM (ref 140–440)
PMV BLD AUTO: 11.4 FL (ref 7.5–11.1)
POTASSIUM SERPL-SCNC: 3.7 MMOL/L (ref 3.5–5.1)
RBC # BLD: 5.29 M/CU MM (ref 4.6–6.2)
SEGMENTED NEUTROPHILS ABSOLUTE COUNT: 3.5 K/CU MM
SEGMENTED NEUTROPHILS RELATIVE PERCENT: 56 % (ref 36–66)
SODIUM BLD-SCNC: 135 MMOL/L (ref 135–145)
TOTAL PROTEIN: 5.6 GM/DL (ref 6.4–8.2)
TRIGL SERPL-MCNC: 181 MG/DL
WBC # BLD: 6.2 K/CU MM (ref 4–10.5)

## 2022-07-12 PROCEDURE — 80053 COMPREHEN METABOLIC PANEL: CPT

## 2022-07-12 PROCEDURE — 2580000003 HC RX 258: Performed by: NURSE PRACTITIONER

## 2022-07-12 PROCEDURE — 85007 BL SMEAR W/DIFF WBC COUNT: CPT

## 2022-07-12 PROCEDURE — 93880 EXTRACRANIAL BILAT STUDY: CPT

## 2022-07-12 PROCEDURE — 99211 OFF/OP EST MAY X REQ PHY/QHP: CPT

## 2022-07-12 PROCEDURE — 6370000000 HC RX 637 (ALT 250 FOR IP): Performed by: NURSE PRACTITIONER

## 2022-07-12 PROCEDURE — 80061 LIPID PANEL: CPT

## 2022-07-12 PROCEDURE — 6360000002 HC RX W HCPCS: Performed by: NURSE PRACTITIONER

## 2022-07-12 PROCEDURE — 99221 1ST HOSP IP/OBS SF/LOW 40: CPT | Performed by: SURGERY

## 2022-07-12 PROCEDURE — 36415 COLL VENOUS BLD VENIPUNCTURE: CPT

## 2022-07-12 PROCEDURE — 2060000000 HC ICU INTERMEDIATE R&B

## 2022-07-12 PROCEDURE — 99223 1ST HOSP IP/OBS HIGH 75: CPT | Performed by: INTERNAL MEDICINE

## 2022-07-12 PROCEDURE — 93306 TTE W/DOPPLER COMPLETE: CPT

## 2022-07-12 PROCEDURE — 94761 N-INVAS EAR/PLS OXIMETRY MLT: CPT

## 2022-07-12 PROCEDURE — 85027 COMPLETE CBC AUTOMATED: CPT

## 2022-07-12 PROCEDURE — 6360000004 HC RX CONTRAST MEDICATION: Performed by: NURSE PRACTITIONER

## 2022-07-12 PROCEDURE — 73701 CT LOWER EXTREMITY W/DYE: CPT

## 2022-07-12 PROCEDURE — 93010 ELECTROCARDIOGRAM REPORT: CPT | Performed by: INTERNAL MEDICINE

## 2022-07-12 PROCEDURE — 83605 ASSAY OF LACTIC ACID: CPT

## 2022-07-12 RX ADMIN — SODIUM CHLORIDE: 9 INJECTION, SOLUTION INTRAVENOUS at 03:33

## 2022-07-12 RX ADMIN — VANCOMYCIN HYDROCHLORIDE 1500 MG: 1 INJECTION, POWDER, LYOPHILIZED, FOR SOLUTION INTRAVENOUS at 18:13

## 2022-07-12 RX ADMIN — SODIUM CHLORIDE: 9 INJECTION, SOLUTION INTRAVENOUS at 12:09

## 2022-07-12 RX ADMIN — PIPERACILLIN AND TAZOBACTAM 3375 MG: 3; .375 INJECTION, POWDER, LYOPHILIZED, FOR SOLUTION INTRAVENOUS at 12:38

## 2022-07-12 RX ADMIN — SODIUM CHLORIDE, PRESERVATIVE FREE 10 ML: 5 INJECTION INTRAVENOUS at 08:38

## 2022-07-12 RX ADMIN — PIPERACILLIN AND TAZOBACTAM 3375 MG: 3; .375 INJECTION, POWDER, LYOPHILIZED, FOR SOLUTION INTRAVENOUS at 21:02

## 2022-07-12 RX ADMIN — ACETAMINOPHEN 650 MG: 325 TABLET ORAL at 23:26

## 2022-07-12 RX ADMIN — SODIUM CHLORIDE: 9 INJECTION, SOLUTION INTRAVENOUS at 23:24

## 2022-07-12 RX ADMIN — ACETAMINOPHEN 650 MG: 325 TABLET ORAL at 00:51

## 2022-07-12 RX ADMIN — FAMOTIDINE 20 MG: 20 TABLET ORAL at 21:01

## 2022-07-12 RX ADMIN — IOPAMIDOL 80 ML: 755 INJECTION, SOLUTION INTRAVENOUS at 09:59

## 2022-07-12 RX ADMIN — ENOXAPARIN SODIUM 40 MG: 100 INJECTION SUBCUTANEOUS at 08:38

## 2022-07-12 RX ADMIN — SODIUM CHLORIDE, PRESERVATIVE FREE 10 ML: 5 INJECTION INTRAVENOUS at 21:01

## 2022-07-12 RX ADMIN — ENOXAPARIN SODIUM 40 MG: 100 INJECTION SUBCUTANEOUS at 21:01

## 2022-07-12 RX ADMIN — FAMOTIDINE 20 MG: 20 TABLET ORAL at 08:37

## 2022-07-12 RX ADMIN — PIPERACILLIN AND TAZOBACTAM 3375 MG: 3; .375 INJECTION, POWDER, LYOPHILIZED, FOR SOLUTION INTRAVENOUS at 03:33

## 2022-07-12 ASSESSMENT — ENCOUNTER SYMPTOMS
EYES NEGATIVE: 1
RESPIRATORY NEGATIVE: 1
ABDOMINAL PAIN: 0
ALLERGIC/IMMUNOLOGIC NEGATIVE: 1

## 2022-07-12 NOTE — CONSULTS
[address] Ostomy Continence Nurse  Consult Note       Venkata Luna  AGE: 67 y.o. GENDER: male  : 1950  TODAY'S DATE:  2022    Subjective:     Reason for  Evaluation and Assessment: wound care eval rt knee. Venkata Luna is a 67 y.o. male referred by:   [x] Physician  [] Nursing  [] Other:     Wound Identification:  Wound Type: traumatic  Contributing Factors: edema and cellulitis        PAST MEDICAL HISTORY        Diagnosis Date    Allergic rhinitis     controlled w otc claritin    Allergy to environmental factors     \"use to take allergy shots none for the past 15 yrs'    Asthma     \"few years ago\"    Elevated blood pressure (not hypertension)     \"never been on b/p medication\"    Hearing loss of left ear     \"in the process of getting a hearing aide    History of kidney stones     \"had them twice in the past- last time over 10 yrs ago- passed them\"    Hx MRSA infection         Hyperlipidemia     Left Achilles tendinitis     Nephrolithiasis     early , passed w/o surgery    S/P colonoscopy 2014    Dr Dick Crooks- severe diverticulosis- recheck 10 yrs       PAST SURGICAL HISTORY    Past Surgical History:   Procedure Laterality Date    ABSCESS DRAINAGE Right 10/12    Dr Zhanna Can- R thumb MRSA infection       FAMILY HISTORY    Family History   Problem Relation Age of Onset    Kidney Disease Father         in his 25s    Early Death Father     Heart Disease Mother     Hypertension Mother     Heart Disease Son         irreg heart beat    Asthma Maternal Uncle        SOCIAL HISTORY    Social History     Tobacco Use    Smoking status: Never Smoker    Smokeless tobacco: Never Used   Substance Use Topics    Alcohol use: Yes     Comment: occ- average 2-3 bottles per week of beer    Drug use: No       ALLERGIES    Allergies   Allergen Reactions    Pollen Extract Shortness Of Breath       MEDICATIONS    No current facility-administered medications on file prior to encounter. Current Outpatient Medications on File Prior to Encounter   Medication Sig Dispense Refill    cephALEXin (KEFLEX) 500 MG capsule Take 500 mg by mouth every 6 hours      atorvastatin (LIPITOR) 10 MG tablet TAKE 1 TABLET BY MOUTH ONE TIME A DAY  30 tablet 5    naproxen (NAPROSYN) 500 MG tablet Take 1 tablet by mouth 2 times daily (with meals).  60 tablet 1         Objective:      /80   Pulse 83   Temp 99.1 °F (37.3 °C) (Oral)   Resp 15   Ht 6' 1\" (1.854 m)   Wt 230 lb (104.3 kg)   SpO2 97%   BMI 30.34 kg/m²   Chaz Risk Score: Chaz Scale Score: 22    LABS    CBC:   Lab Results   Component Value Date/Time    WBC 6.2 07/12/2022 07:05 AM    RBC 5.29 07/12/2022 07:05 AM    HGB 15.4 07/12/2022 07:05 AM    HCT 46.7 07/12/2022 07:05 AM    MCV 88.3 07/12/2022 07:05 AM    MCH 29.1 07/12/2022 07:05 AM    MCHC 33.0 07/12/2022 07:05 AM    RDW 13.0 07/12/2022 07:05 AM     07/12/2022 07:05 AM    MPV 11.4 07/12/2022 07:05 AM     CMP:    Lab Results   Component Value Date/Time     07/12/2022 07:05 AM    K 3.7 07/12/2022 07:05 AM     07/12/2022 07:05 AM    CO2 25 07/12/2022 07:05 AM    BUN 12 07/12/2022 07:05 AM    CREATININE 1.2 07/12/2022 07:05 AM    GFRAA >60 07/12/2022 07:05 AM    AGRATIO 1.6 10/30/2014 04:36 PM    LABGLOM 60 07/12/2022 07:05 AM    LABGLOM 90 10/30/2014 04:36 PM    GLUCOSE 108 07/12/2022 07:05 AM    PROT 5.6 07/12/2022 07:05 AM    LABALBU 3.5 07/12/2022 07:05 AM    CALCIUM 7.9 07/12/2022 07:05 AM    BILITOT 0.9 07/12/2022 07:05 AM    ALKPHOS 57 07/12/2022 07:05 AM     07/12/2022 07:05 AM    ALT 45 07/12/2022 07:05 AM     Albumin:    Lab Results   Component Value Date/Time    LABALBU 3.5 07/12/2022 07:05 AM     PT/INR:  No results found for: PROTIME, INR  HgBA1c:  No results found for: LABA1C      Assessment:     Patient Active Problem List   Diagnosis    Left Achilles tendinitis    Hearing loss of left ear    Nephrolithiasis    Allergic rhinitis    Hyperlipidemia    Sepsis ([address]    Cellulitis       Measurements:  Wound 07/12/22 Knee Anterior;Right (Active)   Wound Image   07/12/22 0810   Wound Etiology Traumatic 07/12/22 0810   Wound Cleansed Cleansed with saline 07/12/22 0810   Dressing/Treatment Betadine swabs/povidone iodine;Open to air 07/12/22 0810   Wound Length (cm) 2.5 cm 07/12/22 0810   Wound Width (cm) 2 cm 07/12/22 0810   Wound Depth (cm) 0.1 cm 07/12/22 0810   Wound Surface Area (cm^2) 5 cm^2 07/12/22 0810   Wound Volume (cm^3) 0.5 cm^3 07/12/22 0810   Distance Tunneling (cm) 0 cm 07/12/22 0810   Tunneling Position ___ O'Clock 0 07/12/22 0810   Undermining Starts ___ O'Clock 0 07/12/22 0810   Undermining Ends___ O'Clock 0 07/12/22 0810   Undermining Maxium Distance (cm) 0 07/12/22 0810   Wound Assessment Eschar dry 07/12/22 0810   Drainage Amount None 07/12/22 0810   Odor None 07/12/22 0810   Arelis-wound Assessment Intact 07/12/22 0810   Margins Attached edges 07/12/22 0810   Number of days: 0       Response to treatment:  Well tolerated by patient. Pain Assessment:  Severity:  none  Quality of pain:   Wound Pain Timing/Severity:   Premedicated: no    Plan:     Plan of Care: Wound 07/12/22 Knee Anterior;Right-Dressing/Treatment: Betadine swabs/povidone iodine,Open to air     Patient in bed wife at bedside agreeable to wound care eval for rt knee. [place] on concrete now with dry eschar/redness/cellulitis. Cleansed with NS, measured and pictured, painted with betadine and open to air. General surgery is consulted to see patient. Pt is generally not at risk for skin breakdown AEB lewis.      Specialty Bed Required : no  [] Low Air Loss   [] Pressure Redistribution  [] Fluid Immersion  [] Bariatric  [] Total Pressure Relief  [] Other:     Discharge Plan:  Placement for patient upon discharge: tbd  Hospice Care: no  Patient appropriate for Outpatient [address] [place], [place] [address]s Road: no    Patient/Caregiver Teaching:  Level of patient/caregiver understanding able to:   Pt and wife voiced understanding. Electronically signed by Brien Rivera RN,  on 7/12/2022 at 9:56 AM

## 2022-07-12 NOTE — PROGRESS NOTES
[id number]  consulted by Angeli Og CNP for monitoring and adjustment. Indication for treatment: SSTI  Goal trough: [x] 10-15 mcg/mL or [] 15-20 mcg/ml  AUC/AURELIA: [x] <500 or [] 400-600    Pertinent Laboratory Values:   Temp Readings from Last 3 Encounters:   07/12/22 97.9 °F (36.6 °C) (Oral)   03/03/14 98.1 °F (36.7 °C) (Oral)     Recent Labs     07/11/22  1833 07/12/22  0705   WBC  --  6.2   LACTATE 2.2* 1.6     Recent Labs     07/12/22  0705   BUN 12   CREATININE 1.2     Estimated Creatinine Clearance: 71 mL/min (based on SCr of 1.2 mg/dL). Intake/Output Summary (Last 24 hours) at 7/12/2022 1638  Last data filed at 7/12/2022 0446  Gross per 24 hour   Intake 240 ml   Output 300 ml   Net -60 ml       Pertinent Cultures:  Date    Source    Results  7/11   Blood    Ordered    Vancomycin level:   TROUGH:  No results for input(s): VANCOTROUGH in the last 72 hours. RANDOM:  No results for input(s): VANCORANDOM in the last 72 hours.     Assessment:  · SCr, BUN, and urine output:  · Scr slightly elevated above baseline Scr (0.9)  · Day(s) of therapy: 2 of 7 (last dose on 07/17/22)  · Vancomycin concentration: to be collected    Plan:  · Based on renal trends, reduce dosing to Vancomycin 1500 mg every 24 hours  · Predicted trough: 13.5,   · Plan to collect a level tomorrow AM  · Pharmacy will continue to monitor patient and adjust therapy as indicated    VANCOMYCIN CONCENTRATION SCHEDULED FOR 7/13 @ 0600    Thank you for the consult,  David Moser [id number] [facility], PharmD  7/12/2022 4:38 PM Sheaths secured via suture and tegaderm: capped

## 2022-07-12 NOTE — CONSULTS
INPATIENT CARDIOLOGY CONSULT NOTE         Reason for consultation:  Syncope     Referring physician:  Jayme Menendez MD     Primary care physician: Blanca Cruz MD      Dear Jayme Menendez MD Thank you for the consult        History of present illness:Wild is a 67 y. o.year old who  presents with syncope    41-year-old gentleman with unremarkable past medical history who does not take any home medication presents to the hospital s/p fall at home. As per patient wife he was at home when he felt dizzy with nausea and vomiting and slid against the wall and passed out for few minutes. Patient has been complaining of intermittent nausea vomiting and epigastric pain for the past few days. He was taken to the [facility] ER for where he was sent to the hospital for admission. Since admission has been diagnosed with sepsis and is currently under treatment. Cardiology consulted to evaluate patient for syncope. Patient denies any prior established history of coronary disease congestive heart failure or arrhythmias. Patient denies any prior history of dizziness presyncope or syncope. Leading to current events, patient mentions that he has been feeling dizzy as well as had intractable nausea vomiting with epigastric pain. EKG shows normal sinus rhythm no ischemic changes unremarkable otherwise. Cardiac troponin negative     In particular patient denies any chest pain shortness of breath or palpitations. Past medical history:    has a past medical history of Allergic rhinitis, Allergy to environmental factors, Asthma, Elevated blood pressure (not hypertension), Hearing loss of left ear, History of kidney stones, Hx MRSA infection, Hyperlipidemia, Left Achilles tendinitis, Nephrolithiasis, and S/P colonoscopy. Past surgical history:   has a past surgical history that includes Abscess Drainage (Right, 10/12). Social History:   reports that he has never smoked.  He has never used smokeless tobacco. He reports current alcohol use. He reports that he does not use drugs.   Family history:   no family history of CAD, STROKE of DM    Allergies   Allergen Reactions    Pollen Extract Shortness Of Breath       sodium chloride flush 0.9 % injection 5-40 mL, 2 times per day  sodium chloride flush 0.9 % injection 5-40 mL, PRN  0.9 % sodium chloride infusion, PRN  polyethylene glycol (GLYCOLAX) packet 17 g, Daily PRN  acetaminophen (TYLENOL) tablet 650 mg, Q6H PRN   Or  acetaminophen (TYLENOL) suppository 650 mg, Q6H PRN  promethazine (PHENERGAN) tablet 12.5 mg, Q6H PRN   Or  ondansetron (ZOFRAN) injection 4 mg, Q6H PRN  piperacillin-tazobactam (ZOSYN) 3,375 mg in dextrose 5 % 50 mL IVPB extended infusion (mini-bag), Q8H  0.9 % sodium chloride infusion, Continuous  sodium chloride flush 0.9 % injection 5-40 mL, 2 times per day  sodium chloride flush 0.9 % injection 5-40 mL, PRN  0.9 % sodium chloride infusion, PRN  ondansetron (ZOFRAN-ODT) disintegrating tablet 4 mg, Q8H PRN   Or  ondansetron (ZOFRAN) injection 4 mg, Q6H PRN  polyethylene glycol (GLYCOLAX) packet 17 g, Daily PRN  famotidine (PEPCID) tablet 20 mg, BID  enoxaparin (LOVENOX) injection 40 mg, BID  vancomycin (VANCOCIN) 1,500 mg in dextrose 5 % 500 mL IVPB, Q18H      Current Facility-Administered Medications   Medication Dose Route Frequency Provider Last Rate Last Admin    sodium chloride flush 0.9 % injection 5-40 mL  5-40 mL IntraVENous 2 times per day Catha ComingsORTIZ - CNP   10 mL at 07/12/22 0838    sodium chloride flush 0.9 % injection 5-40 mL  5-40 mL IntraVENous PRN ORTIZ Snider - CNP        0.9 % sodium chloride infusion   IntraVENous PRN Indira I ORTIZ Demarco - CNP        polyethylene glycol (GLYCOLAX) packet 17 g  17 g Oral Daily PRN ORTIZ Snider - CNP        acetaminophen (TYLENOL) tablet 650 mg  650 mg Oral Q6H PRN ORTIZ Andrew CNP   650 mg at 07/12/22 0051    Or  acetaminophen (TYLENOL) suppository 650 mg  650 mg Rectal Q6H PRN Indira I ORTIZ Demarco - DUONG        promethazine (PHENERGAN) tablet 12.5 mg  12.5 mg Oral Q6H PRN Indira I ORTIZ Demarco - CNP        Or    ondansetron (ZOFRAN) injection 4 mg  4 mg IntraVENous Q6H PRN Indira I ORTIZ Demarco - CNP        piperacillin-tazobactam (ZOSYN) 3,375 mg in dextrose 5 % 50 mL IVPB extended infusion (mini-bag)  3,375 mg IntraVENous Q8H Indira I ORTIZ Coleman - CNP   Stopped at 07/12/22 0840    0.9 % sodium chloride infusion   IntraVENous Continuous ORTIZ Snider -  mL/hr at 07/12/22 1209 New Bag at 07/12/22 1209    sodium chloride flush 0.9 % injection 5-40 mL  5-40 mL IntraVENous 2 times per day ORTIZ Sorensen - CNP   10 mL at 07/12/22 0838    sodium chloride flush 0.9 % injection 5-40 mL  5-40 mL IntraVENous PRN Indira I ORTIZ Demarco - CNP        0.9 % sodium chloride infusion   IntraVENous PRN Indira I ORTIZ Demarco CNP        ondansetron (ZOFRAN-ODT) disintegrating tablet 4 mg  4 mg Oral Q8H PRN Indira I ORTIZ Demarco - CNP        Or    ondansetron (ZOFRAN) injection 4 mg  4 mg IntraVENous Q6H PRN ORTIZ Sorensen - CNP   4 mg at 07/11/22 2055    polyethylene glycol (GLYCOLAX) packet 17 g  17 g Oral Daily PRN Indira I ORTIZ Demarco - CNP        famotidine (PEPCID) tablet 20 mg  20 mg Oral BID ORTIZ Snider - CNP   20 mg at 07/12/22 0837    enoxaparin (LOVENOX) injection 40 mg  40 mg SubCUTAneous BID ORTIZ Snider - CNP   40 mg at 07/12/22 0838    vancomycin (VANCOCIN) 1,500 mg in dextrose 5 % 500 mL IVPB  1,500 mg IntraVENous Q18H Indira Demarco, APRN - CNP             Review of Systems:     · Constitutional: No Fever or Weight Loss   · Eyes: No Decreased Vision  · ENT: No Headaches, Hearing Loss or Vertigo  · Cardiovascular:   no chest pain, no dyspnea on exertion,  no palpitations or loss of consciousness  · Respiratory: No cough or wheezing    · Gastrointestinal: No abdominal pain, appetite loss, blood in stools, constipation, diarrhea or heartburn  · Genitourinary: No dysuria, trouble voiding, or hematuria  · Musculoskeletal:  No gait disturbance, weakness or joint complaints  · Integumentary: No rash or pruritis  · Neurological: No TIA or stroke symptoms  · Psychiatric: No anxiety or depression  · Endocrine: No malaise, fatigue or temperature intolerance  · Hematologic/Lymphatic: No bleeding problems, blood clots or swollen lymph nodes  · Allergic/Immunologic: No nasal congestion or hives    All other systems were reviewed and were negative otherwise. Physical Examination:      Vitals:    07/12/22 0801   BP:    Pulse: 83   Resp: 15   Temp:    SpO2: 97%      Wt Readings from Last 3 Encounters:   07/11/22 230 lb (104.3 kg)   02/16/22 232 lb (105.2 kg)   10/21/14 232 lb (105.2 kg)     Body mass index is 30.34 kg/m². General Appearance:  No distress, conversant  Constitutional:  Well developed, Well nourished  HEENT:  Normocephalic, Atraumatic, Oropharynx moist   Nose normal. Neck Supple Carotid: no carotid bruit  Eyes:  Conjunctiva normal, No discharge. Respiratory:    Normal breath sounds, No respiratory distress, No wheezing, no use of accessory muscles, diaphragm movement is normal  No chest Tenderness  Cardiovascular: S1-S2 Nomurmurs auscultated. No rubs, thrills or gallops. Normal  rhythm. Pedal pulses are normal. Nopedal edema  GI:  Soft Non tender, non distended. Musculoskeletal:   No tenderness, No cyanosis, No clubbing. Integument:  Warm, Dry, No erythema, No rash. Lymphatic:  No lymphadenopathy noted. Neurologic:  Alert & oriented x 3  No focal deficits noted.    Psychiatric:  Affect normal, Judgment normal, Mood normal.       Lab Review     Recent Labs     07/12/22  0705   WBC 6.2   HGB 15.4   HCT 46.7         Recent Labs     07/12/22  0705      K 3.7    CO2 25   BUN 12   CREATININE 1.2     Recent Labs     07/12/22  0705   *   ALT 45*   BILITOT 0.9   ALKPHOS 57     No results for input(s): TROPONINI in the last 72 hours. No results found for: BNP  No results found for: INR, PROTIME      All labs, images, EKGs were personally reviewed      Assessment: 67 y. o.year old with PMH of  has a past medical history of Allergic rhinitis, Allergy to environmental factors, Asthma, Elevated blood pressure (not hypertension), Hearing loss of left ear, History of kidney stones, Hx MRSA infection, Hyperlipidemia, Left Achilles tendinitis, Nephrolithiasis, and S/P colonoscopy. Medical Decision Making :       1. Syncope  2. Likely vasovagal event  3. Intractable nausea vomiting  4. Sepsis  5. Mild renal insufficiency/renal failure secondary to dehydration/nausea vomiting. Above event of syncope likely related to vasovagal event due to nausea vomiting in the setting of sepsis. EKG unremarkable, cardiac troponin negative  For complete work-up, will obtain echocardiogram to rule out any structural heart disease  CT scan chest negative for PE  CT head unremarkable  Check orthostatic blood pressure  Carotid ultrasound pending  Order echocardiogram  Recommend outpatient stress test for risk stratification. Outpatient 14-day event monitor. Rule out cardiac arrhythmias    IV antibiotics as per primary team    Hyperlipidemia: Lipid panel from 2014 showed elevated lipid levels. We will repeat, possible start on low-dose statins.       Thank you for the consult    Dr. Adam Damon  7/12/2022 12:23 PM

## 2022-07-12 NOTE — PROGRESS NOTES
Hospitalist    Spoke to patient and family. [place] - he just tripped on something. He developed a wound on the right knee at that time. He then developed infection in that area - PO abx did not work, he get ill and passed out and went to [facility] ED yesterday. Was given Rocephin and Vanco yesterday at [facility] ED. Blood cultures ordered [facility].  Currently on IV Zosyn and IV Vanco.    Discussed with CT scan - CT RLE to be done this am.

## 2022-07-12 NOTE — CONSULTS
Department of General Surgery   Surgical Service Dr. Amy Mak   Consult Note    Date of Consult: 7/12/22    Reason for Consult:  Right knee wound / cellulitis    Requesting Physician:  Dr. Roberto Carlos Marc:  As above    History Obtained From:  patient, electronic medical record    HISTORY OF PRESENT ILLNESS:      The patient is a 67 y.o. male who presented to the ED with complaints described as:      Location: right knee  Quality: increased pain  Severity: currently denies pain   Duration: several weeks ago then fell again recently   Timing: acute  Context: fell, was treated initially with PO ABx  Modifying factors: denies  Associated signs and symptoms: chills    Pt reported to ED yesterday and was admitted after being worked up with images, exam, and labs. A c/s was placed to Gen Surg. Pt states initially he tripped while playing softball / baseball. That event was about three weeks ago. He states that improved and then he had syncopal episode several days ago and fell again.        Past Medical History:    Past Medical History:   Diagnosis Date    Allergic rhinitis     controlled w otc claritin    Allergy to environmental factors     \"use to take allergy shots none for the past 15 yrs'    Asthma     \"few years ago\"    Elevated blood pressure (not hypertension)     \"never been on b/p medication\"    Hearing loss of left ear     \"in the process of getting a hearing aide    History of kidney stones     \"had them twice in the past- last time over 10 yrs ago- passed them\"    Hx MRSA infection     2012    Hyperlipidemia     Left Achilles tendinitis     Nephrolithiasis     early 1980s, passed w/o surgery    S/P colonoscopy 1/2014    Dr Antony Guerra- severe diverticulosis- recheck 10 yrs       Past Surgical History:    Past Surgical History:   Procedure Laterality Date    ABSCESS DRAINAGE Right 10/12    Dr Wendy Norwood- R thumb MRSA infection       Current Medications:   Current Facility-Administered Medications   Medication Dose Route Frequency Provider Last Rate Last Admin    sodium chloride flush 0.9 % injection 5-40 mL  5-40 mL IntraVENous 2 times per day ORTIZ Chery - CNP   10 mL at 07/12/22 0838    sodium chloride flush 0.9 % injection 5-40 mL  5-40 mL IntraVENous PRN Indira I Dav APRN - CNP        0.9 % sodium chloride infusion   IntraVENous PRN Indira I LILIANA DemarcoN - CNP        polyethylene glycol (GLYCOLAX) packet 17 g  17 g Oral Daily PRN Indira I Dav APRN - CNP        acetaminophen (TYLENOL) tablet 650 mg  650 mg Oral Q6H PRN Doretha Moran APRN - CNP   650 mg at 07/12/22 0051    Or    acetaminophen (TYLENOL) suppository 650 mg  650 mg Rectal Q6H PRN Indira I LILIANA DemarcoN - CNP        promethazine (PHENERGAN) tablet 12.5 mg  12.5 mg Oral Q6H PRN Indira I ORTIZ Demarco - CNP        Or    ondansetron (ZOFRAN) injection 4 mg  4 mg IntraVENous Q6H PRN Indira I Dav APRN - CNP        piperacillin-tazobactam (ZOSYN) 3,375 mg in dextrose 5 % 50 mL IVPB extended infusion (mini-bag)  3,375 mg IntraVENous Q8H Indira I ORTIZ Tan - CNP   Stopped at 07/12/22 0840    0.9 % sodium chloride infusion   IntraVENous Continuous Indira I ORTIZ Demarco -  mL/hr at 07/12/22 1209 New Bag at 07/12/22 1209    sodium chloride flush 0.9 % injection 5-40 mL  5-40 mL IntraVENous 2 times per day ORTIZ Chery - CNP   10 mL at 07/12/22 0838    sodium chloride flush 0.9 % injection 5-40 mL  5-40 mL IntraVENous PRN Indira I LILIANA DemarcoN - CNP        0.9 % sodium chloride infusion   IntraVENous PRN Indira I LILIANA DemarcoN - CNP        ondansetron (ZOFRAN-ODT) disintegrating tablet 4 mg  4 mg Oral Q8H PRN Indira I ORTIZ Demarco CNP        Or    ondansetron (ZOFRAN) injection 4 mg  4 mg IntraVENous Q6H PRN ORTIZ Chery CNP   4 mg at 07/11/22 2055    polyethylene glycol (GLYCOLAX) packet 17 g  17 g Oral Daily PRN ORTIZ Alston CNP        famotidine (PEPCID) tablet 20 mg  20 mg Oral BID ORTIZ Snider CNP   20 mg at 07/12/22 0837    enoxaparin (LOVENOX) injection 40 mg  40 mg SubCUTAneous BID ORTIZ Snider CNP   40 mg at 07/12/22 0838    vancomycin (VANCOCIN) 1,500 mg in dextrose 5 % 500 mL IVPB  1,500 mg IntraVENous Q18H ORTIZ Snider CNP           Allergies:  Pollen extract    Social History:   Social History     Socioeconomic History    Marital status:      Spouse name: Fredy Matos    Number of children: 1    Years of education: Not on file    Highest education level: Not on file   Occupational History    Occupation: [employer]     Comment: as noted   Tobacco Use    Smoking status: Never Smoker    Smokeless tobacco: Never Used   Substance and Sexual Activity    Alcohol use: Yes     Comment: occ- average 2-3 bottles per week of beer    Drug use: No    Sexual activity: Not Currently   Other Topics Concern    Not on file   Social History Narrative    Not on file     Social Determinants of Health     Financial Resource Strain:     Difficulty of Paying Living Expenses: Not on file   Food Insecurity:     Worried About Running Out of Food in the Last Year: Not on file    Charo of Food in the Last Year: Not on file   Transportation Needs:     Lack of Transportation (Medical): Not on file    Lack of Transportation (Non-Medical):  Not on file   Physical Activity:     Days of Exercise per Week: Not on file    Minutes of Exercise per Session: Not on file   Stress:     Feeling of Stress : Not on file   Social Connections:     Frequency of Communication with Friends and Family: Not on file    Frequency of Social Gatherings with Friends and Family: Not on file    Attends Scientologist Services: Not on file    Active Member of Clubs or Organizations: Not on file    Attends Club or Organization Meetings: Not on file    Marital Status: Not on file   Intimate Partner Violence:     Fear of Current or Ex-Partner: Not on file    Emotionally Abused: Not on file    Physically Abused: Not on file    Sexually Abused: Not on file   Housing Stability:     Unable to Pay for Housing in the Last Year: Not on file    Number of Places Lived in the Last Year: Not on file    Unstable Housing in the Last Year: Not on file       Family History:   Family History   Problem Relation Age of Onset    Kidney Disease Father         in his 25s    Early Death Father     Heart Disease Mother     Hypertension Mother     Heart Disease Son         irreg heart beat    Asthma Maternal Uncle        REVIEW OFSYSTEMS:    Review of Systems   Constitutional: Positive for fatigue. Negative for unexpected weight change. HENT: Negative. Eyes: Negative. Respiratory: Negative. Cardiovascular: Negative. Gastrointestinal: Negative for abdominal pain. Endocrine: Negative. Genitourinary: Negative. Musculoskeletal: Positive for arthralgias. Skin: Positive for wound. Allergic/Immunologic: Negative. Neurological: Negative. Hematological: Negative. Psychiatric/Behavioral: Negative. PHYSICAL EXAM:  Vitals:    07/12/22 0400 07/12/22 0700 07/12/22 0740 07/12/22 0801   BP:  139/80     Pulse: 80  92 83   Resp:    15   Temp:  99.1 °F (37.3 °C)     TempSrc:  Oral     SpO2:    97%   Weight:       Height:           Physical Exam  Vitals reviewed. Constitutional:       General: He is not in acute distress. HENT:      Head: Normocephalic and atraumatic. Right Ear: External ear normal.      Left Ear: External ear normal.      Nose: Nose normal.   Eyes:      General:         Right eye: No discharge. Left eye: No discharge. Extraocular Movements: Extraocular movements intact. Cardiovascular:      Rate and Rhythm: Normal rate. Pulmonary:      Effort: No respiratory distress. Abdominal:      Palpations: Abdomen is soft. Tenderness: There is no abdominal tenderness. Musculoskeletal:      Cervical back: Normal range of motion. Right lower leg: Edema (mild compared to left side) present. Skin:     General: Skin is warm. Comments: Right knee eschar, no active drainage, mild tenderness, intact sensation and intact motor     Neurological:      General: No focal deficit present. Mental Status: He is alert.            DATA:    CBC:   Lab Results   Component Value Date/Time    WBC 6.2 07/12/2022 07:05 AM    RBC 5.29 07/12/2022 07:05 AM    HGB 15.4 07/12/2022 07:05 AM    HCT 46.7 07/12/2022 07:05 AM    MCV 88.3 07/12/2022 07:05 AM    MCH 29.1 07/12/2022 07:05 AM    MCHC 33.0 07/12/2022 07:05 AM    RDW 13.0 07/12/2022 07:05 AM     07/12/2022 07:05 AM    MPV 11.4 07/12/2022 07:05 AM     CBC with Differential:    Lab Results   Component Value Date/Time    WBC 6.2 07/12/2022 07:05 AM    RBC 5.29 07/12/2022 07:05 AM    HGB 15.4 07/12/2022 07:05 AM    HCT 46.7 07/12/2022 07:05 AM     07/12/2022 07:05 AM    MCV 88.3 07/12/2022 07:05 AM    MCH 29.1 07/12/2022 07:05 AM    MCHC 33.0 07/12/2022 07:05 AM    RDW 13.0 07/12/2022 07:05 AM    SEGSPCT 67.0 09/21/2012 07:39 AM    LYMPHOPCT 19.7 09/21/2012 07:39 AM    MONOPCT 11.0 09/21/2012 07:39 AM    BASOPCT 0.5 09/21/2012 07:39 AM    MONOSABS 0.8 09/21/2012 07:39 AM    LYMPHSABS 1.4 09/21/2012 07:39 AM    EOSABS 0.1 09/21/2012 07:39 AM    BASOSABS 0.0 09/21/2012 07:39 AM    DIFFTYPE AUTOMATED DIFFERENTIAL 09/21/2012 07:39 AM     WBC:    Lab Results   Component Value Date/Time    WBC 6.2 07/12/2022 07:05 AM     Platelets:    Lab Results   Component Value Date/Time     07/12/2022 07:05 AM     Hemoglobin/Hematocrit:    Lab Results   Component Value Date/Time    HGB 15.4 07/12/2022 07:05 AM    HCT 46.7 07/12/2022 07:05 AM     CMP:    Lab Results   Component Value Date/Time     07/12/2022 07:05 AM    K 3.7 07/12/2022 07:05 AM     07/12/2022 07:05 AM    CO2 25 07/12/2022 07:05 AM    BUN 12 07/12/2022 07:05 AM    CREATININE 1.2 07/12/2022 07:05 AM    GFRAA >60 07/12/2022 07:05 AM    AGRATIO 1.6 10/30/2014 04:36 PM    LABGLOM 60 07/12/2022 07:05 AM    LABGLOM 90 10/30/2014 04:36 PM    GLUCOSE 108 07/12/2022 07:05 AM    PROT 5.6 07/12/2022 07:05 AM    LABALBU 3.5 07/12/2022 07:05 AM    CALCIUM 7.9 07/12/2022 07:05 AM    BILITOT 0.9 07/12/2022 07:05 AM    ALKPHOS 57 07/12/2022 07:05 AM     07/12/2022 07:05 AM    ALT 45 07/12/2022 07:05 AM     BMP:    Lab Results   Component Value Date/Time     07/12/2022 07:05 AM    K 3.7 07/12/2022 07:05 AM     07/12/2022 07:05 AM    CO2 25 07/12/2022 07:05 AM    BUN 12 07/12/2022 07:05 AM    LABALBU 3.5 07/12/2022 07:05 AM    CREATININE 1.2 07/12/2022 07:05 AM    CALCIUM 7.9 07/12/2022 07:05 AM    GFRAA >60 07/12/2022 07:05 AM    LABGLOM 60 07/12/2022 07:05 AM    LABGLOM 90 10/30/2014 04:36 PM    GLUCOSE 108 07/12/2022 07:05 AM     Sodium:    Lab Results   Component Value Date/Time     07/12/2022 07:05 AM     Potassium:    Lab Results   Component Value Date/Time    K 3.7 07/12/2022 07:05 AM     BUN/Creatinine:    Lab Results   Component Value Date/Time    BUN 12 07/12/2022 07:05 AM    CREATININE 1.2 07/12/2022 07:05 AM     Hepatic Function Panel:    Lab Results   Component Value Date/Time    ALKPHOS 57 07/12/2022 07:05 AM    ALT 45 07/12/2022 07:05 AM     07/12/2022 07:05 AM    PROT 5.6 07/12/2022 07:05 AM    BILITOT 0.9 07/12/2022 07:05 AM    LABALBU 3.5 07/12/2022 07:05 AM         Lactic acid: 1.6 from 2.2    CT knee:  Subcutaneous abnormality within the prepatellar soft tissues may reflect   cellulitis or developing prepatellar bursitis.  No defined fluid collection   or soft tissue gas.       No acute osseous abnormality.       Nonspecific subcutaneous edema about the lateral knee and leg may reflect   cellulitis. CT head:  No acute intracranial abnormality.       IMPRESSION:        Patient Active Problem List:     Left Achilles tendinitis     Hearing loss of left ear     Nephrolithiasis     Allergic rhinitis     Hyperlipidemia     Sepsis ([address]     Cellulitis      66 y/o M with right knee edema    PLAN:    -Reviewed labs, images, and physical exam findings with pt. Nonspecific edema on CT. No active fluid collections to drain. No s/s of compartment syndrome. WBC count is WNL. LA is WNL at 1.6. Procalcitonin is 0.284 which is low likelihood of sepsis. By pt's account, he is improving. Monitor closely. Agree with Ortho c/s given possibility of prepatellar bursitis.     -Elevate RLE while at rest. Local wound care to right knee eschar. -F/u blood cultures.     -W/u pending for syncope. Noted normal carotid u/s results. Seen by Cardiology. Suspect vasovagal event.     -Will continue to monitor. Above plan d/w pt and his family at bedside.       Mannie Cramer MD

## 2022-07-13 LAB
ANION GAP SERPL CALCULATED.3IONS-SCNC: 11 MMOL/L (ref 4–16)
BUN BLDV-MCNC: 10 MG/DL (ref 6–23)
CALCIUM SERPL-MCNC: 8 MG/DL (ref 8.3–10.6)
CHLORIDE BLD-SCNC: 101 MMOL/L (ref 99–110)
CO2: 20 MMOL/L (ref 21–32)
CREAT SERPL-MCNC: 1 MG/DL (ref 0.9–1.3)
DOSE AMOUNT: NORMAL
DOSE TIME: NORMAL
GFR AFRICAN AMERICAN: >60 ML/MIN/1.73M2
GFR NON-AFRICAN AMERICAN: >60 ML/MIN/1.73M2
GLUCOSE BLD-MCNC: 112 MG/DL (ref 70–99)
HCT VFR BLD CALC: 45.1 % (ref 42–52)
HEMOGLOBIN: 14.9 GM/DL (ref 13.5–18)
MCH RBC QN AUTO: 28.7 PG (ref 27–31)
MCHC RBC AUTO-ENTMCNC: 33 % (ref 32–36)
MCV RBC AUTO: 86.7 FL (ref 78–100)
PDW BLD-RTO: 12.9 % (ref 11.7–14.9)
PLATELET # BLD: 150 K/CU MM (ref 140–440)
PMV BLD AUTO: 10.9 FL (ref 7.5–11.1)
POTASSIUM SERPL-SCNC: 3.9 MMOL/L (ref 3.5–5.1)
RBC # BLD: 5.2 M/CU MM (ref 4.6–6.2)
SODIUM BLD-SCNC: 132 MMOL/L (ref 135–145)
VANCOMYCIN RANDOM: 7.7 UG/ML
WBC # BLD: 5.2 K/CU MM (ref 4–10.5)

## 2022-07-13 PROCEDURE — 36415 COLL VENOUS BLD VENIPUNCTURE: CPT

## 2022-07-13 PROCEDURE — 99232 SBSQ HOSP IP/OBS MODERATE 35: CPT | Performed by: SURGERY

## 2022-07-13 PROCEDURE — 99233 SBSQ HOSP IP/OBS HIGH 50: CPT | Performed by: INTERNAL MEDICINE

## 2022-07-13 PROCEDURE — 2060000000 HC ICU INTERMEDIATE R&B

## 2022-07-13 PROCEDURE — 80202 ASSAY OF VANCOMYCIN: CPT

## 2022-07-13 PROCEDURE — 6360000002 HC RX W HCPCS: Performed by: NURSE PRACTITIONER

## 2022-07-13 PROCEDURE — 80048 BASIC METABOLIC PNL TOTAL CA: CPT

## 2022-07-13 PROCEDURE — 2580000003 HC RX 258: Performed by: NURSE PRACTITIONER

## 2022-07-13 PROCEDURE — 6370000000 HC RX 637 (ALT 250 FOR IP): Performed by: NURSE PRACTITIONER

## 2022-07-13 PROCEDURE — 85027 COMPLETE CBC AUTOMATED: CPT

## 2022-07-13 PROCEDURE — 94761 N-INVAS EAR/PLS OXIMETRY MLT: CPT

## 2022-07-13 PROCEDURE — 99222 1ST HOSP IP/OBS MODERATE 55: CPT | Performed by: ORTHOPAEDIC SURGERY

## 2022-07-13 RX ADMIN — SODIUM CHLORIDE: 9 INJECTION, SOLUTION INTRAVENOUS at 22:07

## 2022-07-13 RX ADMIN — ENOXAPARIN SODIUM 40 MG: 100 INJECTION SUBCUTANEOUS at 22:02

## 2022-07-13 RX ADMIN — SODIUM CHLORIDE, PRESERVATIVE FREE 10 ML: 5 INJECTION INTRAVENOUS at 22:15

## 2022-07-13 RX ADMIN — PIPERACILLIN AND TAZOBACTAM 3375 MG: 3; .375 INJECTION, POWDER, LYOPHILIZED, FOR SOLUTION INTRAVENOUS at 03:30

## 2022-07-13 RX ADMIN — PIPERACILLIN AND TAZOBACTAM 3375 MG: 3; .375 INJECTION, POWDER, LYOPHILIZED, FOR SOLUTION INTRAVENOUS at 22:09

## 2022-07-13 RX ADMIN — ENOXAPARIN SODIUM 40 MG: 100 INJECTION SUBCUTANEOUS at 09:40

## 2022-07-13 RX ADMIN — FAMOTIDINE 20 MG: 20 TABLET ORAL at 09:40

## 2022-07-13 RX ADMIN — SODIUM CHLORIDE: 9 INJECTION, SOLUTION INTRAVENOUS at 14:13

## 2022-07-13 RX ADMIN — PIPERACILLIN AND TAZOBACTAM 3375 MG: 3; .375 INJECTION, POWDER, LYOPHILIZED, FOR SOLUTION INTRAVENOUS at 14:08

## 2022-07-13 RX ADMIN — VANCOMYCIN HYDROCHLORIDE 1500 MG: 1 INJECTION, POWDER, LYOPHILIZED, FOR SOLUTION INTRAVENOUS at 18:49

## 2022-07-13 RX ADMIN — SODIUM CHLORIDE: 9 INJECTION, SOLUTION INTRAVENOUS at 07:28

## 2022-07-13 RX ADMIN — FAMOTIDINE 20 MG: 20 TABLET ORAL at 22:02

## 2022-07-13 ASSESSMENT — PAIN SCALES - GENERAL
PAINLEVEL_OUTOF10: 0
PAINLEVEL_OUTOF10: 0

## 2022-07-13 NOTE — CONSULTS
Orthopaedic Consult    Patient Name: Sanjay Mendoza   (1/7/2064)  MRN   [id number]   Today's date:  7/13/2022     CHIEF COMPLAINT: Right leg pain    HISTORY OF PRESENT ILLNESS:      The patient is a 67 y.o. male  who presents with right leg pain and swelling. Patient states he fell on concrete at a baseball game suffering an abrasion last week and the abrasion on his right knee worsened and began to cause swelling down into his calf. Patient states the skin on his calf was tight and swollen motivating him to seek treatment in the emergency room. He states he has been on antibiotics after admission and that the swelling has decreased in his calf and his knee is nontender and the joint. He still has point tenderness around the area of the abrasion.         Past Medical History         Diagnosis Date    Allergic rhinitis     controlled w otc claritin    Allergy to environmental factors     \"use to take allergy shots none for the past 15 yrs'    Asthma     \"few years ago\"    Elevated blood pressure (not hypertension)     \"never been on b/p medication\"    Hearing loss of left ear     \"in the process of getting a hearing aide    History of kidney stones     \"had them twice in the past- last time over 10 yrs ago- passed them\"    Hx MRSA infection     2012    Hyperlipidemia     Left Achilles tendinitis     Nephrolithiasis     early 1980s, passed w/o surgery    S/P colonoscopy 1/2014    Dr Nasir Aguilar- severe diverticulosis- recheck 10 yrs       Past Surgical History         Procedure Laterality Date    ABSCESS DRAINAGE Right 10/12    Dr Christi Oconnell- R thumb MRSA infection       Medications Prior to Admission:     Prior to Admission medications    Medication Sig Start Date End Date Taking?  Authorizing Provider   cephALEXin (KEFLEX) 500 MG capsule Take 500 mg by mouth every 6 hours 7/8/22 7/18/22 Yes Historical Provider, MD   atorvastatin (LIPITOR) 10 MG tablet TAKE 1 TABLET BY MOUTH ONE TIME A DAY  1/3/15   Kg Rubi MD   naproxen (NAPROSYN) 500 MG tablet Take 1 tablet by mouth 2 times daily (with meals). 10/21/14   Kg Rubi MD       Allergies     Pollen extract    Social History   TOBACCO:   reports that he has never smoked. He has never used smokeless tobacco.  ETOH:   reports current alcohol use. Patient currently lives with family     Family History         Problem Relation Age of Onset    Kidney Disease Father         in his 25s    Early Death Father     Heart Disease Mother     Hypertension Mother     Heart Disease Son         irreg heart beat    Asthma Maternal Uncle        Review of Systems   As in admission H&P, reviewed. Musculoskeltal as in HPI     Physical Exam:    Vitals: /82   Pulse 82   Temp 99.2 °F (37.3 °C) (Oral)   Resp 18   Ht 6' 1\" (1.854 m)   Wt 230 lb (104.3 kg)   SpO2 98%   BMI 30.34 kg/m² ,  Body mass index is 30.34 kg/m². General appearance: alert, appears stated age and cooperative  Skin: Skin color normal. No rashes or lesions  HEENT: Head: Normocephalic, no lesions, without obvious abnormality. Neck: supple, symmetrical, trachea midline    Extremities:    Right lower extremity:  Homans' sign negative, patient maintains full active range of motion without tenderness of the knee joint. There is a 3 cm abrasion of the superficial tissues on the anterior aspect of the inferior portion of the patella. .  No laceration present. Right knee is cool to the touch and nontender with passive manipulation of the patellar both medially and laterally. No joint effusion. No evidence of fluid at the prepatellar bursa.   Sensation motor function is intact throughout the lower EXTR      Neurologic: Mental status: Alert, oriented, thought content appropriate    Labs     CBC:   Recent Labs     07/12/22  0705 07/13/22  1033   WBC 6.2 5.2   HGB 15.4 14.9    150     BMP:    Recent Labs     07/12/22  0705      K 3.7    CO2 25   BUN 12   CREATININE 1.2   GLUCOSE 108*     INR: No results for input(s): INR in the last 72 hours. X-ray view   Imaging Review  CT KNEE RIGHT W CONTRAST    Result Date: 7/12/2022  EXAMINATION: CT OF THE RIGHT KNEE WITH CONTRAST 7/12/2022 9:58 am TECHNIQUE: CT of the right knee was performed with the administration of intravenous contrast.  Multiplanar reformatted images are provided for review. Automated exposure control, iterative reconstruction, and/or weight based adjustment of the mA/kV was utilized to reduce the radiation dose to as low as reasonably achievable. COMPARISON: None. HISTORY ORDERING SYSTEM PROVIDED HISTORY: cellulitis with concern for gas nonresponsive to outpatient po antibiotics TECHNOLOGIST PROVIDED HISTORY: Reason for exam:->cellulitis with concern for gas nonresponsive to outpatient po antibiotics Reason for Exam: cellulitis with concern for gas nonresponsive to outpatient po antibiotics Additional signs and symptoms: 80ml Isovue 370 FINDINGS: There is no evidence of acute fracture or dislocation. Enthesophyte formation is noted associated with the extensor mechanism. Small patellar osteophytes are noted. Minimal marginal osteophytes are noted at the lateral compartment. There is mild anterior subcutaneous edema about the distal quadriceps tendon. There is increased attenuation within the subcutaneous tissues anterior to the lower pole of the patella. There is no defined fluid collection. There is no underlying bony destructive change. There is no associated soft tissue gas. Subcutaneous edema is noted about the lateral knee and proximal leg. There is no joint effusion. Subcutaneous abnormality within the prepatellar soft tissues may reflect cellulitis or developing prepatellar bursitis. No defined fluid collection or soft tissue gas. No acute osseous abnormality. Nonspecific subcutaneous edema about the lateral knee and leg may reflect cellulitis.      VL DUP CAROTID BILATERAL    Result Date: 7/12/2022  EXAMINATION: ULTRASOUND EVALUATION OF THE CAROTID ARTERIES 7/12/2022 TECHNIQUE: Duplex ultrasound using B-mode/gray scaled imaging, Doppler spectral analysis and color flow Doppler was obtained of the carotid arteries. COMPARISON: None. HISTORY: ORDERING SYSTEM PROVIDED HISTORY: syncope and collapse TECHNOLOGIST PROVIDED HISTORY: Reason for exam:->syncope and collapse FINDINGS: RIGHT: The right common carotid artery demonstrates peak systolic velocities of 94 and 75 cm/sec in the proximal and distal segments respectively. The right internal carotid artery demonstrates the systolic velocities of 81, 89, and 98 cm/sec in the proximal, mid and distal segments respectively. The external carotid artery is patent. The vertebral artery demonstrates normal antegrade flow. No evidence of focal atherosclerotic plaque. ICA/CCA ratio of 1.0 LEFT: The left common carotid artery demonstrates peak systolic velocities of 155 and 69 cm/sec in the proximal and distal segments respectively. The left internal carotid artery demonstrates the systolic velocities of 78, 124, and 104 cm/sec in the proximal, mid and distal segments respectively. The external carotid artery is patent. The vertebral artery demonstrates normal antegrade flow. No evidence of focal atherosclerotic plaque. ICA/CCA ratio of 1.0     The right internal carotid artery demonstrates 0-50% stenosis. The left internal carotid artery demonstrates 0-50% stenosis. Bilateral vertebral arteries are patent with flow in the normal direction. Assessment and Plan     1. right knee abrasion and lower extremity cellulitis    I explained to the patient that currently there is not appear to be any aggressive or deep infection at the knee or leg. His exam is consistent with improving cellulitis at this stage. I do not recommend any surgical intervention currently.   I recommended that he continue with antibiotic therapy as directed by the medical service. He can continue with basic wound care at the healing abrasion. Okay for bathing as needed. Okay for weightbearing as tolerated. Continue treatment for cellulitis. Follow-up as needed if symptoms are not responding to conservative measures.       Aggie Leyva MD

## 2022-07-13 NOTE — PROGRESS NOTES
General Surgery-Dr. Herminio Sosa Day: 3    Chief Complaint on Admission: right leg pain      Subjective:     Feels better. Denies fevers. Ambulating. Tolerating PO. Remains on ABx. ROS:  Review of Systems   Skin: Positive for wound. All other systems reviewed and are negative. Allergies  Pollen extract          Diagnosis Date    Allergic rhinitis     controlled w otc claritin    Allergy to environmental factors     \"use to take allergy shots none for the past 15 yrs'    Asthma     \"few years ago\"    Elevated blood pressure (not hypertension)     \"never been on b/p medication\"    Hearing loss of left ear     \"in the process of getting a hearing aide    History of kidney stones     \"had them twice in the past- last time over 10 yrs ago- passed them\"    Hx MRSA infection         Hyperlipidemia     Left Achilles tendinitis     Nephrolithiasis     early , passed w/o surgery    S/P colonoscopy 2014    Dr Earl Benitez- severe diverticulosis- recheck 10 yrs       Objective:     Vitals:    22 1023   BP: 138/82   Pulse:    Resp:    Temp:    SpO2:        TEMPERATURE:  Current -Temp: 99.2 °F (37.3 °C); Max - Temp  Av.7 °F (37.1 °C)  Min: 97.7 °F (36.5 °C)  Max: 99.8 °F (37.7 °C)    No intake/output data recorded. I/O last 3 completed shifts: In: 4199.1 [P.O.:480; I.V.:3160.5; IV Piggyback:558.7]  Out: 1000 [Urine:1000]      Physical Exam:  Physical Exam  Vitals reviewed. HENT:      Head: Normocephalic and atraumatic. Right Ear: External ear normal.      Left Ear: External ear normal.      Nose: Nose normal.   Eyes:      General:         Right eye: No discharge. Left eye: No discharge. Extraocular Movements: Extraocular movements intact. Cardiovascular:      Rate and Rhythm: Normal rate. Pulmonary:      Effort: No respiratory distress. Abdominal:      Palpations: Abdomen is soft. Musculoskeletal:      Cervical back: Normal range of motion.       Right lower leg: Edema present. Comments: Mild RLE edema, appears improved. Superficial abrasion over right knee. Intact sensation and motor. Neurological:      General: No focal deficit present. Mental Status: He is alert. Psychiatric:         Mood and Affect: Mood normal.           Scheduled Meds:   vancomycin  1,500 mg IntraVENous Q24H    sodium chloride flush  5-40 mL IntraVENous 2 times per day    piperacillin-tazobactam  3,375 mg IntraVENous Q8H    sodium chloride flush  5-40 mL IntraVENous 2 times per day    famotidine  20 mg Oral BID    enoxaparin  40 mg SubCUTAneous BID     ContinuousInfusions:   sodium chloride      sodium chloride 150 mL/hr at 07/13/22 0728    sodium chloride       PRN Meds:sodium chloride flush, sodium chloride, polyethylene glycol, acetaminophen **OR** acetaminophen, promethazine **OR** ondansetron, sodium chloride flush, sodium chloride, ondansetron **OR** ondansetron, polyethylene glycol      Labs/Imaging Results:   Lab Results   Component Value Date    WBC 5.2 07/13/2022    HGB 14.9 07/13/2022    HCT 45.1 07/13/2022    MCV 86.7 07/13/2022     07/13/2022     Lab Results   Component Value Date     07/12/2022    K 3.7 07/12/2022     07/12/2022    CO2 25 07/12/2022    BUN 12 07/12/2022    CREATININE 1.2 07/12/2022    GLUCOSE 108 (H) 07/12/2022    CALCIUM 7.9 (L) 07/12/2022    PROT 5.6 (L) 07/12/2022    LABALBU 3.5 07/12/2022    BILITOT 0.9 07/12/2022    ALKPHOS 57 07/12/2022     (H) 07/12/2022    ALT 45 (H) 07/12/2022    LABGLOM 60 (L) 07/12/2022    GFRAA >60 07/12/2022    AGRATIO 1.6 10/30/2014    GLOB 2.8 10/30/2014       Assessment:       66 y/o M with improving right knee wound / cellulitis     Plan:           -Elevate RLE while at rest. Local wound care to right knee eschar.      -F/u blood cultures.      -W/u pending for syncope. Noted normal carotid u/s results. Seen by Cardiology.  Suspect vasovagal event.      -Will continue to monitor. Above plan d/w pt and his family at bedside.         Electronically signed by Tyra Maldonado II, MD on 7/13/2022 at 11:40 AM

## 2022-07-13 NOTE — PROGRESS NOTES
V2.0  [facility] Hospitalist Progress Note      Name:  Vinny Lou /Age/Sex: 1950  (67 y.o. male)   MRN & CSN:  [id number] & [id number] Encounter Date/Time: 2022 10:05 PM EDT    Location:  -A PCP: Georgi Ramesh MD       Hospital Day: 2    Assessment and Plan:     Cellulitis right knee  -With sepsis as evidenced by leukocytosis, fevers, tachycardia, and tachypnea  -Open wound right knee  -Continue IV antibiotics  -Appreciate surgery consult  -Consult orthopedics for possible prepatellar bursitis      Syncope  -Appreciate cardiology consult  -Echo with no major finding  -Outpatient stress test planned    Diet ADULT DIET; Regular   DVT Prophylaxis [x] Lovenox, []  Heparin, [] SCDs, [] Ambulation,  [] Eliquis, [] Xarelto  [] Coumadin   Code Status Full Code   Disposition From: Home  Expected Disposition: Home  Estimated Date of Discharge: 2-3 days  Patient requires continued admission due to continue IV antibiotics     Subjective:        Patient reports redness of the knee is looking better         Review of Systems:    Review of Systems    No chest pain or dyspnea reported  Objective: Intake/Output Summary (Last 24 hours) at 2022 2205  Last data filed at 2022 0446  Gross per 24 hour   Intake 240 ml   Output 300 ml   Net -60 ml        Vitals:   Vitals:    22 1900   BP: (!) 153/79   Pulse: 94   Resp: 28   Temp:    SpO2: 97%       Physical Exam:     Physical Exam  HENT:      Mouth/Throat:      Pharynx: No posterior oropharyngeal erythema. Eyes:      General:         Right eye: No discharge. Left eye: No discharge. Pulmonary:      Effort: Pulmonary effort is normal.   Musculoskeletal:         General: No swelling. Skin:     Coloration: Skin is not jaundiced. Neurological:      Mental Status: He is alert.            Medications:   Medications:    vancomycin  1,500 mg IntraVENous Q24H    sodium chloride flush  5-40 mL IntraVENous 2 times per day    piperacillin-tazobactam  3,375 mg IntraVENous Q8H    sodium chloride flush  5-40 mL IntraVENous 2 times per day    famotidine  20 mg Oral BID    enoxaparin  40 mg SubCUTAneous BID      Infusions:    sodium chloride      sodium chloride 150 mL/hr at 07/12/22 1209    sodium chloride       PRN Meds: sodium chloride flush, 5-40 mL, PRN  sodium chloride, , PRN  polyethylene glycol, 17 g, Daily PRN  acetaminophen, 650 mg, Q6H PRN   Or  acetaminophen, 650 mg, Q6H PRN  promethazine, 12.5 mg, Q6H PRN   Or  ondansetron, 4 mg, Q6H PRN  sodium chloride flush, 5-40 mL, PRN  sodium chloride, , PRN  ondansetron, 4 mg, Q8H PRN   Or  ondansetron, 4 mg, Q6H PRN  polyethylene glycol, 17 g, Daily PRN        Labs      Recent Results (from the past 24 hour(s))   Comprehensive Metabolic Panel w/ Reflex to MG    Collection Time: 07/12/22  7:05 AM   Result Value Ref Range    Sodium 135 135 - 145 MMOL/L    Potassium 3.7 3.5 - 5.1 MMOL/L    Chloride 100 99 - 110 mMol/L    CO2 25 21 - 32 MMOL/L    BUN 12 6 - 23 MG/DL    CREATININE 1.2 0.9 - 1.3 MG/DL    Glucose 108 (H) 70 - 99 MG/DL    Calcium 7.9 (L) 8.3 - 10.6 MG/DL    Albumin 3.5 3.4 - 5.0 GM/DL    Total Protein 5.6 (L) 6.4 - 8.2 GM/DL    Total Bilirubin 0.9 0.0 - 1.0 MG/DL    ALT 45 (H) 10 - 40 U/L     (H) 15 - 37 IU/L    Alkaline Phosphatase 57 40 - 129 IU/L    GFR Non-African American 60 (L) >60 mL/min/1.73m2    GFR African American >60 >60 mL/min/1.73m2    Anion Gap 10 4 - 16   CBC with Auto Differential    Collection Time: 07/12/22  7:05 AM   Result Value Ref Range    WBC 6.2 4.0 - 10.5 K/CU MM    RBC 5.29 4.6 - 6.2 M/CU MM    Hemoglobin 15.4 13.5 - 18.0 GM/DL    Hematocrit 46.7 42 - 52 %    MCV 88.3 78 - 100 FL    MCH 29.1 27 - 31 PG    MCHC 33.0 32.0 - 36.0 %    RDW 13.0 11.7 - 14.9 %    Platelets 697 686 - 263 K/CU MM    MPV 11.4 (H) 7.5 - 11.1 FL    Bands Relative 9 5 - 11 %    Segs Relative 56.0 36 - 66 %    Eosinophils % 15.0 (H) 0 - 3 %    Basophils % 2.0 (H) 0 - 1 significant valvular disease noted. No evidence of any pericardial effusion. Signature   ------------------------------------------------------------------  Electronically signed by Toma Matos MD (Interpreting  physician) on 07/12/2022 at 01:02 PM  ------------------------------------------------------------------   Findings   Left Ventricle  Left ventricular systolic function is normal.  Ejection fraction is visually estimated at 55-60%. Left ventricle size is normal.  Mild left ventricular hypertrophy. Unable to determine diastolic function due to arrhythmia. Left Atrium  Essentially normal left atrium. Right Atrium  Essentially normal right atrium. Right Ventricle  Essentially normal right ventricle. Aortic Valve  Structurally normal aortic valve. Mitral Valve  Structurally normal mitral valve. Tricuspid Valve  Trace tricuspid regurgitation is present. Pulmonic Valve  The pulmonic valve was not well visualized. Pericardial Effusion  No evidence of any pericardial effusion. Pleural Effusion  No evidence of pleural effusion. Miscellaneous  IVC and abdominal aorta are within normal limits.   M-Mode/2D Measurements & Calculations   LV Diastolic Dimension:  LV Systolic Dimension:  LA Dimension: 3.1 cmAO Root  4.49 cm                  3.21 cm                 Dimension: 3.1 cmLA Area:  LV FS:28.5 %             LV Volume Diastolic: 72 54.8 cm2  LV PW Diastolic: 1.2 cm  ml  LV PW Systolic: 7.05 cm  LV Volume Systolic: 23  Septum Diastolic: 3.20   ml  cm                       LV EDV/LV EDV Index: 72 RV Diastolic Dimension:  Septum Systolic: 1.77 cm AA/28 D8WL ESV/LV ESV   2.62 cm  CO: 4.83 l/min           Index: 23 ml/10 m2  CI: 2.12 l/m*m2          EF Calculated (A4C):    LA/Aorta: 1                           68.1 %  LV Area Diastolic: 94.8  EF Calculated (2D):     LA volume/Index: 25 ml  cm2                      55.1 %                  /71N9  LV Area Systolic: 41.5  cm2 LV Length: 6.96 cm                            LVOT: 2 cm  Doppler Measurements & Calculations   MV Peak E-Wave: 72.6    AV Peak Velocity: 105 cm/s   LVOT Peak Velocity:  cm/s                    AV Peak Gradient: 4.41 mmHg  99.3 cm/s  MV Peak A-Wave: 64.2    AV Mean Velocity: 79.8 cm/s  LVOT Mean Velocity:  cm/s                    AV Mean Gradient: 3 mmHg     67.9 cm/s  MV E/A Ratio: 1.13      AV VTI: 17.7 cm              LVOT Peak Gradient: 4  MV Peak Gradient: 2.11  AV Area (Continuity):3.21    mmHgLVOT Mean Gradient:  mmHg                    cm2                          2 mmHg   MV P1/2t: 57 msec       LVOT VTI: 18.1 cm  MVA by PHT:3.86 cm2   MV E' Septal Velocity:  8.44 cm/s  MV E' Lateral Velocity:  10.2 cm/s  MV E/E' septal: 8.6  MV E/E' lateral: 7.12      CT HEAD WO CONTRAST    Result Date: 7/11/2022  EXAMINATION: CT OF THE HEAD WITHOUT CONTRAST  7/11/2022 3:15 pm TECHNIQUE: CT of the head was performed without the administration of intravenous contrast. Automated exposure control, iterative reconstruction, and/or weight based adjustment of the mA/kV was utilized to reduce the radiation dose to as low as reasonably achievable. COMPARISON: None. HISTORY: ORDERING SYSTEM PROVIDED HISTORY: syncope and collapse TECHNOLOGIST PROVIDED HISTORY: Reason for exam:->syncope and collapse Has a \"code stroke\" or \"stroke alert\" been called? ->No Reason for Exam: syncope and collapse FINDINGS: BRAIN/VENTRICLES: There is no acute intracranial hemorrhage, mass effect or midline shift. No abnormal extra-axial fluid collection. The gray-white differentiation is maintained without evidence of an acute infarct. There is no evidence of hydrocephalus. ORBITS: The visualized portion of the orbits demonstrate no acute abnormality. SINUSES: The visualized paranasal sinuses and mastoid air cells demonstrate no acute abnormality. SOFT TISSUES/SKULL:  No acute abnormality of the visualized skull or soft tissues.      No acute intracranial abnormality. CT KNEE RIGHT W CONTRAST    Result Date: 7/12/2022  EXAMINATION: CT OF THE RIGHT KNEE WITH CONTRAST 7/12/2022 9:58 am TECHNIQUE: CT of the right knee was performed with the administration of intravenous contrast.  Multiplanar reformatted images are provided for review. Automated exposure control, iterative reconstruction, and/or weight based adjustment of the mA/kV was utilized to reduce the radiation dose to as low as reasonably achievable. COMPARISON: None. HISTORY ORDERING SYSTEM PROVIDED HISTORY: cellulitis with concern for gas nonresponsive to outpatient po antibiotics TECHNOLOGIST PROVIDED HISTORY: Reason for exam:->cellulitis with concern for gas nonresponsive to outpatient po antibiotics Reason for Exam: cellulitis with concern for gas nonresponsive to outpatient po antibiotics Additional signs and symptoms: 80ml Isovue 370 FINDINGS: There is no evidence of acute fracture or dislocation. Enthesophyte formation is noted associated with the extensor mechanism. Small patellar osteophytes are noted. Minimal marginal osteophytes are noted at the lateral compartment. There is mild anterior subcutaneous edema about the distal quadriceps tendon. There is increased attenuation within the subcutaneous tissues anterior to the lower pole of the patella. There is no defined fluid collection. There is no underlying bony destructive change. There is no associated soft tissue gas. Subcutaneous edema is noted about the lateral knee and proximal leg. There is no joint effusion. Subcutaneous abnormality within the prepatellar soft tissues may reflect cellulitis or developing prepatellar bursitis. No defined fluid collection or soft tissue gas. No acute osseous abnormality. Nonspecific subcutaneous edema about the lateral knee and leg may reflect cellulitis.      VL DUP CAROTID BILATERAL    Result Date: 7/12/2022  EXAMINATION: ULTRASOUND EVALUATION OF THE CAROTID ARTERIES 7/12/2022 TECHNIQUE: Duplex ultrasound using B-mode/gray scaled imaging, Doppler spectral analysis and color flow Doppler was obtained of the carotid arteries. COMPARISON: None. HISTORY: ORDERING SYSTEM PROVIDED HISTORY: syncope and collapse TECHNOLOGIST PROVIDED HISTORY: Reason for exam:->syncope and collapse FINDINGS: RIGHT: The right common carotid artery demonstrates peak systolic velocities of 94 and 75 cm/sec in the proximal and distal segments respectively. The right internal carotid artery demonstrates the systolic velocities of 81, 89, and 98 cm/sec in the proximal, mid and distal segments respectively. The external carotid artery is patent. The vertebral artery demonstrates normal antegrade flow. No evidence of focal atherosclerotic plaque. ICA/CCA ratio of 1.0 LEFT: The left common carotid artery demonstrates peak systolic velocities of 443 and 69 cm/sec in the proximal and distal segments respectively. The left internal carotid artery demonstrates the systolic velocities of 78, 124, and 104 cm/sec in the proximal, mid and distal segments respectively. The external carotid artery is patent. The vertebral artery demonstrates normal antegrade flow. No evidence of focal atherosclerotic plaque. ICA/CCA ratio of 1.0     The right internal carotid artery demonstrates 0-50% stenosis. The left internal carotid artery demonstrates 0-50% stenosis. Bilateral vertebral arteries are patent with flow in the normal direction.        Electronically signed by Gaurang Arce MD on 7/12/2022 at 10:05 PM

## 2022-07-13 NOTE — PROGRESS NOTES
[address] [facility]  consulted by Estelita Ruiz CNP for monitoring and adjustment. Indication for treatment: SSTI  Goal trough: [x] 10-15 mcg/mL or [] 15-20 mcg/ml  AUC/AURELIA: [x] <500 or [] 400-600    Pertinent Laboratory Values:   Temp Readings from Last 3 Encounters:   07/13/22 99.1 °F (37.3 °C) (Oral)   03/03/14 98.1 °F (36.7 °C) (Oral)     Recent Labs     07/11/22  1833 07/12/22  0705 07/13/22  1033   WBC  --  6.2 5.2   LACTATE 2.2* 1.6  --      Recent Labs     07/12/22  0705 07/13/22  1033   BUN 12 10   CREATININE 1.2 1.0     Estimated Creatinine Clearance: 85 mL/min (based on SCr of 1 mg/dL). Intake/Output Summary (Last 24 hours) at 7/13/2022 1322  Last data filed at 7/13/2022 0000  Gross per 24 hour   Intake 3959.13 ml   Output 700 ml   Net 3259.13 ml       Pertinent Cultures:  Date    Source    Results  7/11   Blood    Ordered    Vancomycin level:   TROUGH:  No results for input(s): VANCOTROUGH in the last 72 hours. RANDOM:    Recent Labs     07/13/22  1033   VANCORANDOM 7.7       Assessment:  · SCr, BUN, and urine output:  · Scr slightly elevated above baseline Scr (0.9) - but improving today = 1.0  · Day(s) of therapy: 2 of 7 (last dose on 07/17/22)  · Vancomycin concentration: 7.7 - continue same dose & freq.     Plan:  · Based on renal trends, CONTINUE SAME DOSE AND FREQUENCY =  ·  Vancomycin 1500 mg every 24 hours  · Predicted trough: 13.5,   · Plan to collect a level tomorrow AM  · Pharmacy will continue to monitor patient and adjust therapy as indicated    Omar 3 7/14 @ 0600    Thank you for the consult,  Rivka Sharp [address] [facility], PharmD  7/13/2022 1:22 PM

## 2022-07-13 NOTE — PROGRESS NOTES
Subjective: Patient states he fell on concrete at a baseball game suffering an abrasion last week and the abrasion on his right knee worsened and began to cause swelling down into his calf. Patient states the skin on his calf was tight and swollen motivating him to seek treatment in the emergency room. He states he has been on antibiotics after admission and that the swelling has decreased in his calf and his knee is nontender and the joint. He still has point tenderness around the area of the abrasion. Objective: Shravan Sell' sign negative, patient maintains full active range of motion without tenderness of the knee joint. There is a 3 cm abrasion of the superficial tissues on the anterior aspect of the inferior portion of the patella. .  No laceration present. Right knee is cool to the touch and nontender with passive manipulation of the patellar both medially and laterally. Assessment: 1. Abrasion to the right knee without intra-articular involvement    Plan: Patient should remain on IV antibiotics throughout today and he can weight-bear as tolerated. Patient will be seen by Dr. Norm Suarez at some point today we will make a determination on whether the superficial area will require debridement or if the patient can remain on a conservative path of treatment with IV antibiotics for discharge.     Chris Day PA-C

## 2022-07-13 NOTE — PROGRESS NOTES
To Dr Bess Del Cid :  Hi. Patient is wondering if being discharged today. Wife has to leave to go be with a family member at 2pm and would like to take the patient with her before that.   Thanks

## 2022-07-13 NOTE — PROGRESS NOTES
CARDIOLOGY PROGRESS NOTE                                                  Name:  Trav Voss /Age/Sex: 1950  (67 y.o. male)   MRN & CSN:  [id number] & [id number] Admission Date/Time: 2022 11:26 AM   Location:  -A PCP: David Astorag MD         Admit Date:  2022  Hospital Day: 3      SUBJECTIVE:   Seen patient as follow up as consultation for syncope     No chest pain. No shortness of breath  No palpations    TELEMETRY: SR        Intake/Output Summary (Last 24 hours) at 2022 1825  Last data filed at 2022 0000  Gross per 24 hour   Intake 3959.13 ml   Output 700 ml   Net 3259.13 ml       Assessment/Plan:           1. Syncope  2. Likely vasovagal event  3. Intractable nausea vomiting  4. Sepsis  5. Mild renal insufficiency/renal failure secondary to dehydration/nausea vomiting.     Above event of syncope likely related to vasovagal event due to nausea vomiting in the setting of sepsis. EKG unremarkable, cardiac troponin negative  Cardiogram is unremarkable. CT scan chest negative for PE  CT head unremarkable  Check orthostatic blood pressure  Carotid ultrasound: Insignificant stenosis less than 51%    Recommend outpatient stress test for risk stratification. Outpatient 14-day event monitor. Rule out cardiac arrhythmias     IV antibiotics as per primary team     Hyperlipidemia: Mildly elevated, recommend low-fat diet     Please call us with any questions      ECHO      Summary   Left ventricular systolic function is normal.   Ejection fraction is visually estimated at 55-60%. Mild left ventricular hypertrophy. No significant valvular disease noted. No evidence of any pericardial effusion.       Past medical history:    has a past medical history of Allergic rhinitis, Allergy to environmental factors, Asthma, Elevated blood pressure (not hypertension), Hearing loss of left ear, History of kidney stones, Hx MRSA infection, Hyperlipidemia, Left Achilles tendinitis, IntraVENous Q24H    sodium chloride flush  5-40 mL IntraVENous 2 times per day    piperacillin-tazobactam  3,375 mg IntraVENous Q8H    sodium chloride flush  5-40 mL IntraVENous 2 times per day    famotidine  20 mg Oral BID    enoxaparin  40 mg SubCUTAneous BID      sodium chloride      sodium chloride 150 mL/hr at 07/13/22 1413    sodium chloride       sodium chloride flush, sodium chloride, acetaminophen **OR** acetaminophen, promethazine **OR** ondansetron, sodium chloride flush, sodium chloride, ondansetron **OR** ondansetron, polyethylene glycol  Allergies   Allergen Reactions    Pollen Extract Shortness Of Breath       Lab Data:  CBC:   Recent Labs     07/12/22  0705 07/13/22  1033   WBC 6.2 5.2   HGB 15.4 14.9   HCT 46.7 45.1   MCV 88.3 86.7    150     BMP:   Recent Labs     07/12/22  0705 07/13/22  1033    132*   K 3.7 3.9    101   CO2 25 20*   BUN 12 10   CREATININE 1.2 1.0     LIVER PROFILE:   Recent Labs     07/12/22  0705   *   ALT 45*   BILITOT 0.9   ALKPHOS [facility] 7/13/2022 6:25 PM

## 2022-07-14 VITALS
RESPIRATION RATE: 21 BRPM | DIASTOLIC BLOOD PRESSURE: 93 MMHG | WEIGHT: 230 LBS | SYSTOLIC BLOOD PRESSURE: 151 MMHG | OXYGEN SATURATION: 98 % | TEMPERATURE: 98.6 F | BODY MASS INDEX: 30.48 KG/M2 | HEIGHT: 73 IN | HEART RATE: 78 BPM

## 2022-07-14 LAB
ANION GAP SERPL CALCULATED.3IONS-SCNC: 10 MMOL/L (ref 4–16)
BUN BLDV-MCNC: 9 MG/DL (ref 6–23)
CALCIUM SERPL-MCNC: 8.2 MG/DL (ref 8.3–10.6)
CHLORIDE BLD-SCNC: 104 MMOL/L (ref 99–110)
CO2: 23 MMOL/L (ref 21–32)
CREAT SERPL-MCNC: 1 MG/DL (ref 0.9–1.3)
DOSE AMOUNT: NORMAL
DOSE TIME: NORMAL
GFR AFRICAN AMERICAN: >60 ML/MIN/1.73M2
GFR NON-AFRICAN AMERICAN: >60 ML/MIN/1.73M2
GLUCOSE BLD-MCNC: 110 MG/DL (ref 70–99)
HCT VFR BLD CALC: 40.2 % (ref 42–52)
HEMOGLOBIN: 13.5 GM/DL (ref 13.5–18)
HIGH SENSITIVE C-REACTIVE PROTEIN: 65.1 MG/L
MCH RBC QN AUTO: 28.2 PG (ref 27–31)
MCHC RBC AUTO-ENTMCNC: 33.6 % (ref 32–36)
MCV RBC AUTO: 84.1 FL (ref 78–100)
PDW BLD-RTO: 12.5 % (ref 11.7–14.9)
PLATELET # BLD: 165 K/CU MM (ref 140–440)
PMV BLD AUTO: 11.2 FL (ref 7.5–11.1)
POTASSIUM SERPL-SCNC: 3.7 MMOL/L (ref 3.5–5.1)
PROCALCITONIN: 0.27
RBC # BLD: 4.78 M/CU MM (ref 4.6–6.2)
SODIUM BLD-SCNC: 137 MMOL/L (ref 135–145)
VANCOMYCIN RANDOM: 10.2 UG/ML
WBC # BLD: 5.4 K/CU MM (ref 4–10.5)

## 2022-07-14 PROCEDURE — 94761 N-INVAS EAR/PLS OXIMETRY MLT: CPT

## 2022-07-14 PROCEDURE — 86141 C-REACTIVE PROTEIN HS: CPT

## 2022-07-14 PROCEDURE — 99232 SBSQ HOSP IP/OBS MODERATE 35: CPT | Performed by: SURGERY

## 2022-07-14 PROCEDURE — 80202 ASSAY OF VANCOMYCIN: CPT

## 2022-07-14 PROCEDURE — 6370000000 HC RX 637 (ALT 250 FOR IP): Performed by: NURSE PRACTITIONER

## 2022-07-14 PROCEDURE — 85027 COMPLETE CBC AUTOMATED: CPT

## 2022-07-14 PROCEDURE — 36415 COLL VENOUS BLD VENIPUNCTURE: CPT

## 2022-07-14 PROCEDURE — 6360000002 HC RX W HCPCS: Performed by: NURSE PRACTITIONER

## 2022-07-14 PROCEDURE — 6370000000 HC RX 637 (ALT 250 FOR IP): Performed by: SURGERY

## 2022-07-14 PROCEDURE — 80048 BASIC METABOLIC PNL TOTAL CA: CPT

## 2022-07-14 PROCEDURE — 84145 PROCALCITONIN (PCT): CPT

## 2022-07-14 PROCEDURE — 2580000003 HC RX 258: Performed by: NURSE PRACTITIONER

## 2022-07-14 RX ORDER — IBUPROFEN 200 MG
TABLET ORAL 2 TIMES DAILY
Status: DISCONTINUED | OUTPATIENT
Start: 2022-07-14 | End: 2022-07-14 | Stop reason: HOSPADM

## 2022-07-14 RX ORDER — AMOXICILLIN AND CLAVULANATE POTASSIUM 875; 125 MG/1; MG/1
1 TABLET, FILM COATED ORAL 2 TIMES DAILY
Qty: 14 TABLET | Refills: 0 | Status: SHIPPED | OUTPATIENT
Start: 2022-07-14 | End: 2022-07-21

## 2022-07-14 RX ORDER — SULFAMETHOXAZOLE AND TRIMETHOPRIM 800; 160 MG/1; MG/1
1 TABLET ORAL 2 TIMES DAILY
Qty: 14 TABLET | Refills: 0 | Status: SHIPPED | OUTPATIENT
Start: 2022-07-14 | End: 2022-07-21

## 2022-07-14 RX ADMIN — PIPERACILLIN AND TAZOBACTAM 3375 MG: 3; .375 INJECTION, POWDER, LYOPHILIZED, FOR SOLUTION INTRAVENOUS at 11:16

## 2022-07-14 RX ADMIN — BACITRACIN ZINC, NEOMYCIN SULFATE, POLYMYXIN B SULFATE 1 G: 3.5; 5000; 4 OINTMENT TOPICAL at 11:16

## 2022-07-14 RX ADMIN — PIPERACILLIN AND TAZOBACTAM 3375 MG: 3; .375 INJECTION, POWDER, LYOPHILIZED, FOR SOLUTION INTRAVENOUS at 03:12

## 2022-07-14 RX ADMIN — ENOXAPARIN SODIUM 40 MG: 100 INJECTION SUBCUTANEOUS at 09:05

## 2022-07-14 RX ADMIN — SODIUM CHLORIDE, PRESERVATIVE FREE 5 ML: 5 INJECTION INTRAVENOUS at 09:06

## 2022-07-14 RX ADMIN — SODIUM CHLORIDE, PRESERVATIVE FREE 10 ML: 5 INJECTION INTRAVENOUS at 09:14

## 2022-07-14 RX ADMIN — SODIUM CHLORIDE: 9 INJECTION, SOLUTION INTRAVENOUS at 10:40

## 2022-07-14 RX ADMIN — FAMOTIDINE 20 MG: 20 TABLET ORAL at 09:05

## 2022-07-14 RX ADMIN — SODIUM CHLORIDE: 9 INJECTION, SOLUTION INTRAVENOUS at 03:15

## 2022-07-14 ASSESSMENT — PAIN DESCRIPTION - LOCATION: LOCATION: KNEE

## 2022-07-14 ASSESSMENT — PAIN SCALES - GENERAL: PAINLEVEL_OUTOF10: 0

## 2022-07-14 NOTE — PROGRESS NOTES
V2.0  [facility]      Name:  Rudy Bryant /Age/Sex: 1950  (67 y.o. male)   MRN & CSN:  [id number] & [id number] Encounter Date/Time: 2022 10:05 PM EDT    Location:  -A PCP: Anna Rhoades MD       Hospital Day: 3    Assessment and Plan:     Cellulitis right knee  -With sepsis as evidenced by leukocytosis, fevers, tachycardia, and tachypnea  -Open wound right knee-healing  -Continue IV antibiotics- right calf is still swollen and warm. -Appreciate surgery consult  -Consult orthopedics for possible prepatellar bursitis      Syncope  -Appreciate cardiology consult  -Echo with no major finding  -Outpatient stress test planned    Diet ADULT DIET; Regular   DVT Prophylaxis [x] Lovenox, []  Heparin, [] SCDs, [] Ambulation,  [] Eliquis, [] Xarelto  [] Coumadin   Code Status Full Code   Disposition From: Home  Expected Disposition: Home  Estimated Date of Discharge: 1 to 2 days as the right calf is still warm and significantly swollen     Subjective:        He feels better today         Review of Systems:    Review of Systems    No dyspnea or chest pain reported  Objective: Intake/Output Summary (Last 24 hours) at 2022 2158  Last data filed at 2022 1900  Gross per 24 hour   Intake 6854.99 ml   Output 1600 ml   Net 5254.99 ml        Vitals:   Vitals:    22   BP: (!) 142/89   Pulse: 83   Resp: 28   Temp: 99.5 °F (37.5 °C)   SpO2: 97%       Physical Exam:     Physical Exam  HENT:      Mouth/Throat:      Pharynx: No posterior oropharyngeal erythema. Eyes:      General:         Right eye: No discharge. Left eye: No discharge. Pulmonary:      Effort: Pulmonary effort is normal.   Musculoskeletal:         General: No swelling. Skin:     Coloration: Skin is not jaundiced. Neurological:      Mental Status: He is alert.            Medications:   Medications:    vancomycin  1,500 mg IntraVENous Q24H    sodium chloride flush  5-40 mL IntraVENous 2 times per day    piperacillin-tazobactam  3,375 mg IntraVENous Q8H    sodium chloride flush  5-40 mL IntraVENous 2 times per day    famotidine  20 mg Oral BID    enoxaparin  40 mg SubCUTAneous BID      Infusions:    sodium chloride      sodium chloride 150 mL/hr at 07/13/22 1900    sodium chloride       PRN Meds: sodium chloride flush, 5-40 mL, PRN  sodium chloride, , PRN  acetaminophen, 650 mg, Q6H PRN   Or  acetaminophen, 650 mg, Q6H PRN  promethazine, 12.5 mg, Q6H PRN   Or  ondansetron, 4 mg, Q6H PRN  sodium chloride flush, 5-40 mL, PRN  sodium chloride, , PRN  ondansetron, 4 mg, Q8H PRN   Or  ondansetron, 4 mg, Q6H PRN  polyethylene glycol, 17 g, Daily PRN        Labs      Recent Results (from the past 24 hour(s))   Vancomycin Level, Random    Collection Time: 07/13/22 10:33 AM   Result Value Ref Range    Vancomycin Rm 7.7 UG/ML    DOSE AMOUNT DOSE AMT.  GIVEN - `     DOSE TIME DOSE TIME GIVEN - `    CBC    Collection Time: 07/13/22 10:33 AM   Result Value Ref Range    WBC 5.2 4.0 - 10.5 K/CU MM    RBC 5.20 4.6 - 6.2 M/CU MM    Hemoglobin 14.9 13.5 - 18.0 GM/DL    Hematocrit 45.1 42 - 52 %    MCV 86.7 78 - 100 FL    MCH 28.7 27 - 31 PG    MCHC 33.0 32.0 - 36.0 %    RDW 12.9 11.7 - 14.9 %    Platelets 399 111 - 029 K/CU MM    MPV 10.9 7.5 - 11.1 FL   Basic Metabolic Panel    Collection Time: 07/13/22 10:33 AM   Result Value Ref Range    Sodium 132 (L) 135 - 145 MMOL/L    Potassium 3.9 3.5 - 5.1 MMOL/L    Chloride 101 99 - 110 mMol/L    CO2 20 (L) 21 - 32 MMOL/L    Anion Gap 11 4 - 16    BUN 10 6 - 23 MG/DL    CREATININE 1.0 0.9 - 1.3 MG/DL    Glucose 112 (H) 70 - 99 MG/DL    Calcium 8.0 (L) 8.3 - 10.6 MG/DL    GFR Non-African American >60 >60 mL/min/1.73m2    GFR African American >60 >60 mL/min/1.73m2        Imaging/Diagnostics Last 24 Hours   Echocardiogram complete 2D with doppler with color    Result Date: 7/12/2022  Transthoracic Echocardiography Report (TTE)  Demographics   Patient Name JOHANN HENRIQUEZ     Date of Study       07/12/2022   Date of Birth      1950         Gender              Male   Age                67 year(s)         Race                   Patient Number     [id number]         Room Number         2029   Visit Number       [id number]   Corporate ID       [id number]   Accession Number   [id number]         [address] [place] [address] ,                                                            [facility]   Ordering Physician Lia Rojo Interpreting        Kat Romeo CNP                Physician           MD  Procedure Type of Study   TTE procedure:ECHOCARDIOGRAM COMPLETE 2D W DOPPLER W COLOR. Procedure Date Date: 07/12/2022 Start: 11:23 AM Study Location: Portable Technical Quality: Adequate visualization Indications:Syncope. Patient Status: Routine Height: 73 inches Weight: 230 pounds BSA: 2.28 m2 BMI: 30.34 kg/m2 HR: 85 bpm BP: 121/75 mmHg  Conclusions   Summary  Left ventricular systolic function is normal.  Ejection fraction is visually estimated at 55-60%. Mild left ventricular hypertrophy. No significant valvular disease noted. No evidence of any pericardial effusion. Signature   ------------------------------------------------------------------  Electronically signed by Kat Romeo MD (Interpreting  physician) on 07/12/2022 at 01:02 PM  ------------------------------------------------------------------   Findings   Left Ventricle  Left ventricular systolic function is normal.  Ejection fraction is visually estimated at 55-60%. Left ventricle size is normal.  Mild left ventricular hypertrophy. Unable to determine diastolic function due to arrhythmia. Left Atrium  Essentially normal left atrium. Right Atrium  Essentially normal right atrium. Right Ventricle  Essentially normal right ventricle. Aortic Valve  Structurally normal aortic valve. Mitral Valve  Structurally normal mitral valve.    Tricuspid Valve Trace tricuspid regurgitation is present. Pulmonic Valve  The pulmonic valve was not well visualized. Pericardial Effusion  No evidence of any pericardial effusion. Pleural Effusion  No evidence of pleural effusion. Miscellaneous  IVC and abdominal aorta are within normal limits.   M-Mode/2D Measurements & Calculations   LV Diastolic Dimension:  LV Systolic Dimension:  LA Dimension: 3.1 cmAO Root  4.49 cm                  3.21 cm                 Dimension: 3.1 cmLA Area:  LV FS:28.5 %             LV Volume Diastolic: 72 74.2 cm2  LV PW Diastolic: 1.2 cm  ml  LV PW Systolic: 3.01 cm  LV Volume Systolic: 23  Septum Diastolic: 7.62   ml  cm                       LV EDV/LV EDV Index: 72 RV Diastolic Dimension:  Septum Systolic: 8.05 cm QQ/35 V5SY ESV/LV ESV   2.62 cm  CO: 4.83 l/min           Index: 23 ml/10 m2  CI: 2.12 l/m*m2          EF Calculated (A4C):    LA/Aorta: 1                           68.1 %  LV Area Diastolic: 63.6  EF Calculated (2D):     LA volume/Index: 25 ml  cm2                      55.1 %                  /12S2  LV Area Systolic: 21.2  cm2                      LV Length: 6.96 cm                            LVOT: 2 cm  Doppler Measurements & Calculations   MV Peak E-Wave: 72.6    AV Peak Velocity: 105 cm/s   LVOT Peak Velocity:  cm/s                    AV Peak Gradient: 4.41 mmHg  99.3 cm/s  MV Peak A-Wave: 64.2    AV Mean Velocity: 79.8 cm/s  LVOT Mean Velocity:  cm/s                    AV Mean Gradient: 3 mmHg     67.9 cm/s  MV E/A Ratio: 1.13      AV VTI: 17.7 cm              LVOT Peak Gradient: 4  MV Peak Gradient: 2.11  AV Area (Continuity):3.21    mmHgLVOT Mean Gradient:  mmHg                    cm2                          2 mmHg   MV P1/2t: 57 msec       LVOT VTI: 18.1 cm  MVA by PHT:3.86 cm2   MV E' Septal Velocity:  8.44 cm/s  MV E' Lateral Velocity:  10.2 cm/s  MV E/E' septal: 8.6  MV E/E' lateral: 7.12      CT HEAD WO CONTRAST    Result Date: 7/11/2022  EXAMINATION: CT OF THE HEAD WITHOUT CONTRAST  7/11/2022 3:15 pm TECHNIQUE: CT of the head was performed without the administration of intravenous contrast. Automated exposure control, iterative reconstruction, and/or weight based adjustment of the mA/kV was utilized to reduce the radiation dose to as low as reasonably achievable. COMPARISON: None. HISTORY: ORDERING SYSTEM PROVIDED HISTORY: syncope and collapse TECHNOLOGIST PROVIDED HISTORY: Reason for exam:->syncope and collapse Has a \"code stroke\" or \"stroke alert\" been called? ->No Reason for Exam: syncope and collapse FINDINGS: BRAIN/VENTRICLES: There is no acute intracranial hemorrhage, mass effect or midline shift. No abnormal extra-axial fluid collection. The gray-white differentiation is maintained without evidence of an acute infarct. There is no evidence of hydrocephalus. ORBITS: The visualized portion of the orbits demonstrate no acute abnormality. SINUSES: The visualized paranasal sinuses and mastoid air cells demonstrate no acute abnormality. SOFT TISSUES/SKULL:  No acute abnormality of the visualized skull or soft tissues. No acute intracranial abnormality. CT KNEE RIGHT W CONTRAST    Result Date: 7/12/2022  EXAMINATION: CT OF THE RIGHT KNEE WITH CONTRAST 7/12/2022 9:58 am TECHNIQUE: CT of the right knee was performed with the administration of intravenous contrast.  Multiplanar reformatted images are provided for review. Automated exposure control, iterative reconstruction, and/or weight based adjustment of the mA/kV was utilized to reduce the radiation dose to as low as reasonably achievable. COMPARISON: None.  HISTORY ORDERING SYSTEM PROVIDED HISTORY: cellulitis with concern for gas nonresponsive to outpatient po antibiotics TECHNOLOGIST PROVIDED HISTORY: Reason for exam:->cellulitis with concern for gas nonresponsive to outpatient po antibiotics Reason for Exam: cellulitis with concern for gas nonresponsive to outpatient po antibiotics Additional signs and symptoms: 80ml Isovue 370 FINDINGS: There is no evidence of acute fracture or dislocation. Enthesophyte formation is noted associated with the extensor mechanism. Small patellar osteophytes are noted. Minimal marginal osteophytes are noted at the lateral compartment. There is mild anterior subcutaneous edema about the distal quadriceps tendon. There is increased attenuation within the subcutaneous tissues anterior to the lower pole of the patella. There is no defined fluid collection. There is no underlying bony destructive change. There is no associated soft tissue gas. Subcutaneous edema is noted about the lateral knee and proximal leg. There is no joint effusion. Subcutaneous abnormality within the prepatellar soft tissues may reflect cellulitis or developing prepatellar bursitis. No defined fluid collection or soft tissue gas. No acute osseous abnormality. Nonspecific subcutaneous edema about the lateral knee and leg may reflect cellulitis. VL DUP CAROTID BILATERAL    Result Date: 7/12/2022  EXAMINATION: ULTRASOUND EVALUATION OF THE CAROTID ARTERIES 7/12/2022 TECHNIQUE: Duplex ultrasound using B-mode/gray scaled imaging, Doppler spectral analysis and color flow Doppler was obtained of the carotid arteries. COMPARISON: None. HISTORY: ORDERING SYSTEM PROVIDED HISTORY: syncope and collapse TECHNOLOGIST PROVIDED HISTORY: Reason for exam:->syncope and collapse FINDINGS: RIGHT: The right common carotid artery demonstrates peak systolic velocities of 94 and 75 cm/sec in the proximal and distal segments respectively. The right internal carotid artery demonstrates the systolic velocities of 81, 89, and 98 cm/sec in the proximal, mid and distal segments respectively. The external carotid artery is patent. The vertebral artery demonstrates normal antegrade flow. No evidence of focal atherosclerotic plaque.  ICA/CCA ratio of 1.0 LEFT: The left common carotid artery demonstrates peak systolic velocities of 029 and 69 cm/sec in the proximal and distal segments respectively. The left internal carotid artery demonstrates the systolic velocities of 78, 124, and 104 cm/sec in the proximal, mid and distal segments respectively. The external carotid artery is patent. The vertebral artery demonstrates normal antegrade flow. No evidence of focal atherosclerotic plaque. ICA/CCA ratio of 1.0     The right internal carotid artery demonstrates 0-50% stenosis. The left internal carotid artery demonstrates 0-50% stenosis. Bilateral vertebral arteries are patent with flow in the normal direction.        Electronically signed by Gaurang Arce MD on 7/13/2022 at 9:58 PM

## 2022-07-14 NOTE — PROGRESS NOTES
Hospitalist    [facility] ED records (copied and pasted):         [place] [id number]    Anatomical Region Laterality Modality   Arm -- Ultrasound       Impression  Performed by [facility]  1.  Negative for deep vein thrombosis in the right lower extremity.            This dictation was created with voice recognition software.  While attempts have been made to review the dictation as it is transcribed, on occasion the spoken word can be misinterpreted by the technology leading to omissions or inappropriate words, phrases or sentences.      Electronically Signed by: Luigi Arizmendi MD, 7/8/2022 11:00 PM

## 2022-07-14 NOTE — PROGRESS NOTES
Discharge instructions given, patient and wife voiced understanding. To private auto ( wife driving home) via w/c per pct. A.O x 4. No signs of any acute distress noted.

## 2022-07-14 NOTE — PROGRESS NOTES
Pt refused bath. Encouraged multiple times. Stated he \" goes home tomorrow morning and will take a shower there. \"

## 2022-07-14 NOTE — PROGRESS NOTES
General Surgery-Dr. Stiven Benson Day: 4    Chief Complaint on Admission: right leg pain      Subjective:     Feels better. Less swelling. Denies fevers. Tolerating PO. Remains on ABx. ROS:  Review of Systems   Skin: Positive for wound. All other systems reviewed and are negative. Allergies  Pollen extract          Diagnosis Date    Allergic rhinitis     controlled w otc claritin    Allergy to environmental factors     \"use to take allergy shots none for the past 15 yrs'    Asthma     \"few years ago\"    Elevated blood pressure (not hypertension)     \"never been on b/p medication\"    Hearing loss of left ear     \"in the process of getting a hearing aide    History of kidney stones     \"had them twice in the past- last time over 10 yrs ago- passed them\"    Hx MRSA infection         Hyperlipidemia     Left Achilles tendinitis     Nephrolithiasis     early , passed w/o surgery    S/P colonoscopy 2014    Dr Caty Smith- severe diverticulosis- recheck 10 yrs       Objective:     Vitals:    22 0654   BP:    Pulse: 79   Resp: 18   Temp:    SpO2:        TEMPERATURE:  Current -Temp: 99 °F (37.2 °C); Max - Temp  Av.3 °F (37.4 °C)  Min: 99 °F (37.2 °C)  Max: 99.6 °F (37.6 °C)    No intake/output data recorded. I/O last 3 completed shifts: In: 3109 [P.O.:1200; I.V.:4990.5; IV Piggyback:664.5]  Out: 2500 [Urine:2500]      Physical Exam:  Physical Exam  Vitals reviewed. HENT:      Head: Normocephalic and atraumatic. Right Ear: External ear normal.      Left Ear: External ear normal.      Nose: Nose normal.   Eyes:      General:         Right eye: No discharge. Left eye: No discharge. Extraocular Movements: Extraocular movements intact. Cardiovascular:      Rate and Rhythm: Normal rate. Pulmonary:      Effort: No respiratory distress. Abdominal:      Palpations: Abdomen is soft. Musculoskeletal:      Cervical back: Normal range of motion.       Right lower leg: Edema present. Comments: Mild RLE edema, appears improved. Superficial abrasion over right knee. Intact sensation and motor. Neurological:      General: No focal deficit present. Mental Status: He is alert. Psychiatric:         Mood and Affect: Mood normal.           Scheduled Meds:   vancomycin  1,500 mg IntraVENous Q24H    sodium chloride flush  5-40 mL IntraVENous 2 times per day    piperacillin-tazobactam  3,375 mg IntraVENous Q8H    sodium chloride flush  5-40 mL IntraVENous 2 times per day    famotidine  20 mg Oral BID    enoxaparin  40 mg SubCUTAneous BID     ContinuousInfusions:   sodium chloride      sodium chloride 150 mL/hr at 07/14/22 0315    sodium chloride 25 mL/hr at 07/13/22 2207     PRN Meds:sodium chloride flush, sodium chloride, acetaminophen **OR** acetaminophen, promethazine **OR** ondansetron, sodium chloride flush, sodium chloride, ondansetron **OR** ondansetron, polyethylene glycol      Labs/Imaging Results:   Lab Results   Component Value Date    WBC 5.4 07/14/2022    HGB 13.5 07/14/2022    HCT 40.2 (L) 07/14/2022    MCV 84.1 07/14/2022     07/14/2022     Lab Results   Component Value Date     07/14/2022    K 3.7 07/14/2022     07/14/2022    CO2 23 07/14/2022    BUN 9 07/14/2022    CREATININE 1.0 07/14/2022    GLUCOSE 110 (H) 07/14/2022    CALCIUM 8.2 (L) 07/14/2022    PROT 5.6 (L) 07/12/2022    LABALBU 3.5 07/12/2022    BILITOT 0.9 07/12/2022    ALKPHOS 57 07/12/2022     (H) 07/12/2022    ALT 45 (H) 07/12/2022    LABGLOM >60 07/14/2022    GFRAA >60 07/14/2022    AGRATIO 1.6 10/30/2014    GLOB 2.8 10/30/2014       Assessment:       66 y/o M with improving right knee wound / cellulitis     Plan:           -Elevate RLE while at rest. Local wound care to right knee eschar.      -F/u blood cultures - No growth at 48 hrs.      -W/u pending for syncope. Noted normal carotid u/s results. Seen by Cardiology. Suspect vasovagal event.

## 2022-07-14 NOTE — PROGRESS NOTES
[address] [facility]  consulted by Angeli Og CNP for monitoring and adjustment. Indication for treatment: SSTI  Goal trough: [x] 10-15 mcg/mL or [] 15-20 mcg/ml  AUC/AURELIA: [x] <500 or [] 400-600    Pertinent Laboratory Values:   Temp Readings from Last 3 Encounters:   07/14/22 98.6 °F (37 °C) (Oral)   03/03/14 98.1 °F (36.7 °C) (Oral)     Recent Labs     07/11/22  1833 07/12/22  0705 07/13/22  1033 07/14/22  0609   WBC  --  6.2 5.2 5.4   LACTATE 2.2* 1.6  --   --      Recent Labs     07/12/22  0705 07/13/22  1033 07/14/22  0609   BUN 12 10 9   CREATININE 1.2 1.0 1.0     Estimated Creatinine Clearance: 85 mL/min (based on SCr of 1 mg/dL). Intake/Output Summary (Last 24 hours) at 7/14/2022 1605  Last data filed at 7/14/2022 1316  Gross per 24 hour   Intake 4101.27 ml   Output 1800 ml   Net 2301.27 ml       Pertinent Cultures:  Date    Source    Results  7/11   Blood    NGTD    Vancomycin level:   TROUGH:  No results for input(s): VANCOTROUGH in the last 72 hours.   RANDOM:    Recent Labs     07/13/22  1033 07/14/22  0609   VANCORANDOM 7.7 10.2       Assessment:  · SCr, BUN, and urine output:  · Scr stable, close to baseline  · Scr baseline 0.9  · Day(s) of therapy: 4 of 7 (last dose on 07/17/22)  · Vancomycin concentration:   · 7/13: 7.7, therapeutic  · 7/14: 10.2, collected 11h post-dose, , below goal    Plan:  · Increase vancomycin dosing to 1000 mg IVPB q12h  · Predicted trough: 12.5, AUC <500  · Repeat next level in 48h  · Pharmacy will continue to monitor patient and adjust therapy as indicated    VANCOMYCIN CONCENTRATION SCHEDULED FOR 7/16 @ 0600    Thank you for the consult,  Claude Fetter, [facility], PharmD  7/14/2022 4:05 PM

## 2022-07-16 LAB
CULTURE: NORMAL
Lab: NORMAL
SPECIMEN: NORMAL

## 2022-07-19 DIAGNOSIS — R55 SYNCOPE, UNSPECIFIED SYNCOPE TYPE: Primary | ICD-10-CM

## 2022-07-21 NOTE — DISCHARGE SUMMARY
V2.0  Discharge Summary    Name:  Graciela Lakhani DOB/Age/Sex: 1950 (20 y.o. male)   Admit Date: 2022  Discharge Date: 22    MRN & CSN:  [id number] & [id number] Encounter Date:  22    Attending:  Dr. Dolores Raygoza Discharging Provider: Kecia Guzman MD       Hospital Course:     Brief HPI and Hospital Course:         Graciela Lakhani is a pleasant 67 y.o. male who had recently [place] which resulted in a small wound over her right knee. He developed an infection in that area, and was started on oral antibiotics as outpatient. However, on  he presented to [facility] ED after syncope. He had been feeling ill for several days and was found to have evidence of significant right lower extremity cellulitis as well as a 101 fever in the ED. He was transferred here to [facility], and was admitted. While here he was treated with IV Zosyn and IV vancomycin for 3 days. The warmth, erythema, and swelling of the entire right leg from the knee down has now improved. He was here for 3 days from  until , and was discharged home with 7 days of Augmentin and Bactrim. Problem list    Sepsis due to right lower extremity cellulitis  -Sepsis with classified as severe, since lactic acid was elevated at 2.2 on arrival here  -Occurred after a fall which resulted in a small wound on his right knee  -CT of right lower extremity showed possible prepatellar bursitis. He was seen by Dr. Mary Cruz of orthopedics while here, and it was not felt that he needed any debridement.  -Upon discharge he will be sent home with Augmentin and Bactrim, Bactrim was added due to history of MRSA. Syncope  -Appreciate cardiology consult. Echo with no major finding  -Outpatient stress test planned for risk stratification, and outpatient 14-day event monitor is planned as well.     Hyperlipidemia  -He had been prescribed atorvastatin in , but is not taking it    Obesity with BMI 30.3  Never smoked    History of MRSA infection in 2012-at that time he had a right thumb abscess    The patient expressed appropriate understanding of, and agreement with the discharge recommendations, medications, and plan. Abnormal tests  -LDL-he had been prescribed atorvastatin in 2015, but apparently is not taking it. Calculated LDL this admission is 126.  -Liver enzymes: He has elevated liver enzymes. ALT is 45, and AST is 129. Recommend outpatient follow-up. -CTA chest done at [facility] ED showed hepatic steatosis, gallbladder calculi, and pancreatic atrophy. I printed the result and gave it to the family and asked him to follow-up. He is hard of hearing.     Consults this admission:  IP CONSULT TO GENERAL SURGERY  IP CONSULT TO CARDIOLOGY  PHARMACY TO DOSE VANCOMYCIN  IP CONSULT TO ORTHOPEDIC SURGERY  IP CONSULT TO [address] [place] [address]    Discharge Diagnosis:   Sepsis [facility])    Discharge Instruction:   Follow up appointments: Primary care physician  Primary care physician:   Diet: cardiac diet   Activity: activity as tolerated  Disposition: Discharged to:   [x]Home  Condition on discharge: Stable  Labs and Tests to be Followed up as an outpatient by PCP or Specialist: elevated LFT's    Discharge Medications:        Medication List        START taking these medications      amoxicillin-clavulanate 875-125 MG per tablet  Commonly known as: AUGMENTIN  Take 1 tablet by mouth 2 times daily for 7 days     sulfamethoxazole-trimethoprim 800-160 MG per tablet  Commonly known as: BACTRIM DS;SEPTRA DS  Take 1 tablet by mouth 2 times daily for 7 days            CONTINUE taking these medications      atorvastatin 10 MG tablet  Commonly known as: LIPITOR  TAKE 1 TABLET BY MOUTH ONE TIME A DAY            STOP taking these medications      cephALEXin 500 MG capsule  Commonly known as: Delma Avila               Where to Get Your Medications        These medications were sent to [facility] [address] [facility], [facility] [phone] - F [phone]  [address] [facility] [address]      Phone: [phone]   amoxicillin-clavulanate 875-125 MG per tablet  sulfamethoxazole-trimethoprim 800-160 MG per tablet        Objective Findings at Discharge:   BP (!) 151/93   Pulse 78   Temp 98.6 °F (37 °C) (Oral)   Resp 21   Ht 6' 1\" (1.854 m)   Wt 230 lb (104.3 kg)   SpO2 98%   BMI 30.34 kg/m²       Physical Exam:   General: NAD          Labs and Imaging     Lipids:   Lab Results   Component Value Date/Time    CHOL 181 07/12/2022 07:05 AM    CHOL 244 10/30/2014 04:36 PM    HDL 19 07/12/2022 07:05 AM    TRIG 181 07/12/2022 07:05 AM       TSH:   Lab Results   Component Value Date/Time    TSH 1.7 10/30/2014 04:36 PM     Troponin:   Lab Results   Component Value Date/Time    TROPONINT <0.010 07/11/2022 06:33 PM     Lab Results   Component Value Date/Time    [facility] MRSA 09/21/2012 08:15 AM       Time Spent Discharging patient 35 minutes    Electronically signed by Amber Quintanilla MD on 7/21/2022 at 10:38 AM

## 2022-07-27 ENCOUNTER — PROCEDURE VISIT (OUTPATIENT)
Dept: CARDIOLOGY CLINIC | Age: 72
End: 2022-07-27
Payer: MEDICARE

## 2022-07-27 ENCOUNTER — NURSE ONLY (OUTPATIENT)
Dept: CARDIOLOGY CLINIC | Age: 72
End: 2022-07-27
Payer: MEDICARE

## 2022-07-27 DIAGNOSIS — R55 SYNCOPE, UNSPECIFIED SYNCOPE TYPE: Primary | ICD-10-CM

## 2022-07-27 DIAGNOSIS — R55 SYNCOPE, UNSPECIFIED SYNCOPE TYPE: ICD-10-CM

## 2022-07-27 PROCEDURE — 93015 CV STRESS TEST SUPVJ I&R: CPT | Performed by: INTERNAL MEDICINE

## 2022-07-27 PROCEDURE — 93246 EXT ECG>7D<15D RECORDING: CPT | Performed by: INTERNAL MEDICINE

## 2022-07-27 NOTE — PROGRESS NOTES
Applied @ 1045, 14day holter w/monitor# [id number] for Dx of syncope. Educated pt on proper holter usage; how to keep sx diary; & when to bring monitor back to office. Pt voiced understanding. Holter order,including monitor & card#, & time started, to front nurse's station in 's in-box.

## 2022-08-10 PROCEDURE — 93248 EXT ECG>7D<15D REV&INTERPJ: CPT | Performed by: INTERNAL MEDICINE

## 2022-08-17 ENCOUNTER — OFFICE VISIT (OUTPATIENT)
Dept: CARDIOLOGY CLINIC | Age: 72
End: 2022-08-17
Payer: MEDICARE

## 2022-08-17 VITALS
SYSTOLIC BLOOD PRESSURE: 122 MMHG | HEART RATE: 76 BPM | WEIGHT: 232.4 LBS | OXYGEN SATURATION: 99 % | DIASTOLIC BLOOD PRESSURE: 86 MMHG | HEIGHT: 72 IN | BODY MASS INDEX: 31.48 KG/M2

## 2022-08-17 DIAGNOSIS — R55 SYNCOPE AND COLLAPSE: Primary | ICD-10-CM

## 2022-08-17 DIAGNOSIS — R11.2 NAUSEA AND VOMITING, INTRACTABILITY OF VOMITING NOT SPECIFIED, UNSPECIFIED VOMITING TYPE: ICD-10-CM

## 2022-08-17 DIAGNOSIS — N17.9 ACUTE RENAL FAILURE, UNSPECIFIED ACUTE RENAL FAILURE TYPE (HCC): ICD-10-CM

## 2022-08-17 DIAGNOSIS — E66.09 CLASS 1 OBESITY DUE TO EXCESS CALORIES WITHOUT SERIOUS COMORBIDITY WITH BODY MASS INDEX (BMI) OF 31.0 TO 31.9 IN ADULT: ICD-10-CM

## 2022-08-17 DIAGNOSIS — R55 VASOVAGAL EPISODE: ICD-10-CM

## 2022-08-17 PROCEDURE — 1123F ACP DISCUSS/DSCN MKR DOCD: CPT | Performed by: INTERNAL MEDICINE

## 2022-08-17 PROCEDURE — 99214 OFFICE O/P EST MOD 30 MIN: CPT | Performed by: INTERNAL MEDICINE

## 2022-08-17 NOTE — PATIENT INSTRUCTIONS
Please be informed that if you contact our office outside of normal business hours the physician on call cannot help with any scheduling or rescheduling issues, procedure instruction questions or any type of medication issue. We advise you for any urgent/emergency that you go to the nearest emergency room! PLEASE CALL OUR OFFICE DURING NORMAL BUSINESS HOURS    Monday - Friday   8 am to 5 pm    [place] [address]: [phone]    [place]:  [phone]    **It is YOUR responsibilty to bring medication bottles and/or updated medication list to [address] [place] [address].  This will allow us to better serve you and all your healthcare needs**

## 2022-08-17 NOTE — PROGRESS NOTES
Harman Fernández MD                                  CARDIOLOGY  NOTE           Chief Complaint:    Chief Complaint   Patient presents with    Follow-up     Pt denies cardiac symptoms    Results        HPI:     Dave Echevarria is a 67y.o. year old male who was recently evaluated in the hospital for syncope    Syncope was largely attributed to vasovagal event in the setting of intractable nausea vomiting sepsis renal failure. Patient presents for follow-up today    CT scan of the chest was negative for PE CT T of the head was unremarkable orthostatic blood pressure normal.  Carotid ultrasound insignificant stenosis. Patient was recommended to follow-up as outpatient for stress test and event monitor. EST 7/27/2022      Conclusion:   Normal exercise performance without angina and ischemic EKG changes. Functional Capacity: normal;     ECHO 7/12/2022     Left ventricular systolic function is normal.   Ejection fraction is visually estimated at 55-60%. Mild left ventricular hypertrophy. No significant valvular disease noted. No evidence of any pericardial effusion. Carotid USG  7/12/2022    Impression   The right internal carotid artery demonstrates 0-50% stenosis. The left internal carotid artery demonstrates 0-50% stenosis. Bilateral vertebral arteries are patent with flow in the normal direction. Current Outpatient Medications   Medication Sig Dispense Refill    atorvastatin (LIPITOR) 10 MG tablet TAKE 1 TABLET BY MOUTH ONE TIME A DAY  (Patient not taking: Reported on 8/17/2022) 30 tablet 5     No current facility-administered medications for this visit.        Allergies:     Pollen extract    Patient History:    Past Medical History:   Diagnosis Date    Allergic rhinitis     controlled w otc claritin    Allergy to environmental factors     \"use to take allergy shots none for the past 15 yrs'    Asthma     \"few years ago\"    Elevated blood pressure (not hypertension)     \"never been on b/p medication\"    Hearing loss of left ear     \"in the process of getting a hearing aide    History of kidney stones     \"had them twice in the past- last time over 10 yrs ago- passed them\"    Hx MRSA infection     2012    Hyperlipidemia     Left Achilles tendinitis     Nephrolithiasis     early 1980s, passed w/o surgery    S/P colonoscopy 1/2014    Dr Carol Dobson- severe diverticulosis- recheck 10 yrs     Past Surgical History:   Procedure Laterality Date    ABSCESS DRAINAGE Right 10/12    Dr Dean Corporal- R thumb MRSA infection     Family History   Problem Relation Age of Onset    Kidney Disease Father         in his 25s    Early Death Father     Heart Disease Mother     Hypertension Mother     Heart Disease Son         irreg heart beat    Asthma Maternal Uncle      Social History     Tobacco Use    Smoking status: Never    Smokeless tobacco: Never   Substance Use Topics    Alcohol use: Yes     Comment: occ- average 2-3 bottles per week of beer        Review of Systems:     Constitutional:  No Fever or Weight Loss   Eyes: No Decreased Vision  ENT: No Headaches, Hearing Loss or Vertigo  Cardiovascular: No Chest Pain,  No Shortness of breath, No Palpitations. No Edema   Respiratory: No cough or wheezing .  No Respiratory distress   Gastrointestinal: No abdominal pain, appetite loss, blood in stools, constipation, diarrhea or heartburn  Genitourinary: No dysuria, trouble voiding, or hematuria  Musculoskeletal:  denies any new  joint aches , or pain   Integumentary: No rash or pruritis  Neurological: No TIA or stroke symptoms  Psychiatric: No anxiety or depression  Endocrine: No malaise, fatigue or temperature intolerance  Hematologic/Lymphatic: No bleeding problems, blood clots or swollen lymph nodes  Allergic/Immunologic: No nasal congestion or hives        Objective:      Physical Exam:    /86 (Site: Left Upper Arm, Position: Sitting, Cuff Size: Large Adult)   Pulse 76   Ht 6' (1.829 m)   Wt 232 lb 6.4 oz (105.4 kg) SpO2 99%   BMI 31.52 kg/m²   Wt Readings from Last 3 Encounters:   08/17/22 232 lb 6.4 oz (105.4 kg)   07/11/22 230 lb (104.3 kg)   02/16/22 232 lb (105.2 kg)     Body mass index is 31.52 kg/m². Vitals:    08/17/22 0955   BP: 122/86   Pulse: 76   SpO2: 99%        General Appearance and Constitutional: Conversant, Well developed, Well nourished, No acute distress, Non-toxic appearance. HEENT:  Normocephalic, Atraumatic, Bilateral external ears normal, Oropharynx moist, No oral exudates,   Nose normal.   Neck- Normal range of motion, No tenderness, Supple  Eyes:  EOMI, Conjunctiva normal, No discharge. Respiratory:  Normal breath sounds, No respiratory distress, No wheezing, No Rales, No Ronchi. No chest tenderness. Cardiovascular: S1-S2, no added heart sounds, No Mumurs appreciated. No gallops, rubs. No Pedal Edema   GI:  Bowel sounds normal, Soft, No tenderness,  :  No costovertebral angle tenderness   Musculoskeletal:  No gross deformities.  Back- No tenderness  Integument:  Well hydrated, no rash   Lymphatic:  No lymphadenopathy noted   Neurologic:  Alert & oriented x 3, Normal motor function, normal sensory function, no focal deficits noted   Psychiatric:  Speech and behavior appropriate       Medical decision making and Data review:    DATA:    Lab Results   Component Value Date    TROPONINT <0.010 07/11/2022     BNP:  No results found for: PROBNP  PT/INR:  No results found for: PTINR  No results found for: LABA1C  Lab Results   Component Value Date    CHOL 181 07/12/2022    TRIG 181 (H) 07/12/2022    HDL 19 (L) 07/12/2022    LDLCALC 126 (H) 07/12/2022     Lab Results   Component Value Date    WBC 5.4 07/14/2022    HGB 13.5 07/14/2022    HCT 40.2 (L) 07/14/2022    MCV 84.1 07/14/2022     07/14/2022     TSH:   Lab Results   Component Value Date    TSH 1.7 10/30/2014     Lab Results   Component Value Date     (H) 07/12/2022    ALT 45 (H) 07/12/2022    BILITOT 0.9 07/12/2022    ALKPHOS 57 07/12/2022         All labs, medications and tests reviewed by myself including data and history from outside source , patient and available family . 1. Syncope and collapse    2. Acute renal failure, unspecified acute renal failure type ([address]    3. Nausea and vomiting, intractability of vomiting not specified, unspecified vomiting type    4. Vasovagal episode    5. Class 1 obesity due to excess calories without serious comorbidity with body mass index (BMI) of 31.0 to 31.9 in adult         Impression and Plan:        Syncope  Acute renal failure, resolved  Intractable nausea vomiting  Obesity with BMI of 31.5 to          In the setting of nausea diarrhea renal failure, likely vasovagal event  Patient was treated with IV hydration with renal failure resolved. Echocardiogram normal  Exercise stress test normal  Carotid ultrasound within normal limits  14-day event Holter monitor, pending results. If abnormal our office will call to schedule an appointment. Recommend to keep himself hydrated  Exercise as possible, low-fat diet  Establish care with primary care physician           Return if symptoms worsen or fail to improve. Counseled extensively and medication compliance urged. We discussed that for the  prevention of ASCVD our  goal is aggressive risk modification. Patient is encouraged to exercise even a brisk walk for 30 minutes  at least 3 to 4 times a week   Various goals were discussed and questions answered. Continue current medications. Appropriate prescriptions are addressed and refills ordered. Questions answered and patient verbalizes understanding. Call for any problems, questions, or concerns.

## 2022-08-18 ENCOUNTER — TELEPHONE (OUTPATIENT)
Dept: CARDIOLOGY CLINIC | Age: 72
End: 2022-08-18

## 2022-08-18 NOTE — TELEPHONE ENCOUNTER
Per Dr. Rosey Moura. Patient needs appt with EP for abn event monitor. Message to EP staff to schedule.

## 2022-08-19 ENCOUNTER — TELEPHONE (OUTPATIENT)
Dept: CARDIOLOGY CLINIC | Age: 72
End: 2022-08-19

## 2022-09-14 ENCOUNTER — INITIAL CONSULT (OUTPATIENT)
Dept: CARDIOLOGY CLINIC | Age: 72
End: 2022-09-14
Payer: MEDICARE

## 2022-09-14 VITALS
WEIGHT: 236.6 LBS | BODY MASS INDEX: 32.05 KG/M2 | DIASTOLIC BLOOD PRESSURE: 80 MMHG | SYSTOLIC BLOOD PRESSURE: 136 MMHG | HEIGHT: 72 IN

## 2022-09-14 DIAGNOSIS — I47.1 SVT (SUPRAVENTRICULAR TACHYCARDIA) (HCC): Primary | ICD-10-CM

## 2022-09-14 DIAGNOSIS — R55 SYNCOPE AND COLLAPSE: ICD-10-CM

## 2022-09-14 PROCEDURE — 93000 ELECTROCARDIOGRAM COMPLETE: CPT | Performed by: INTERNAL MEDICINE

## 2022-09-14 PROCEDURE — 99203 OFFICE O/P NEW LOW 30 MIN: CPT | Performed by: INTERNAL MEDICINE

## 2022-09-14 PROCEDURE — 1123F ACP DISCUSS/DSCN MKR DOCD: CPT | Performed by: INTERNAL MEDICINE

## 2022-09-14 RX ORDER — ASPIRIN 81 MG/1
81 TABLET ORAL DAILY
Qty: 90 TABLET | Refills: 1 | Status: SHIPPED | OUTPATIENT
Start: 2022-09-14

## 2022-09-14 RX ORDER — DILTIAZEM HYDROCHLORIDE 120 MG/1
120 CAPSULE, COATED, EXTENDED RELEASE ORAL DAILY
Qty: 30 CAPSULE | Refills: 3 | Status: SHIPPED | OUTPATIENT
Start: 2022-09-14

## 2022-09-14 NOTE — PATIENT INSTRUCTIONS
Please be informed that if you contact our office outside of normal business hours the physician on call cannot help with any scheduling or rescheduling issues, procedure instruction questions or any type of medication issue. We advise you for any urgent/emergency that you go to the nearest emergency room! PLEASE CALL OUR OFFICE DURING NORMAL BUSINESS HOURS    Monday - Friday   8 am to 5 pm    [place]: [place] [address]: [phone]    [place]:  [phone]      **It is YOUR responsibilty to bring medication bottles and/or updated medication list to [address] [place] [address].  This will allow us to better serve you and all your healthcare needs**

## 2022-09-14 NOTE — PROGRESS NOTES
Electrophysiology Consult Note      Reason for consultation:  SVT    Chief complaint : Palpitations    Referring physician:       Primary care physician: No primary care provider on file. History of Present Illness:     Patient is 67 yr old male with hx of asthma, hyperlipidemia referred by  for SVT management. Patient reports palpitations before but on medication is feeling better. Patient denies palpitations now. He was in the hospital before but now is doing good with medication. Denies chest pain, shortness of breath, palpitations, syncope or presyncope, edema. Denies smoking and denies palpitations    Pastmedical history:   Past Medical History:   Diagnosis Date    Allergic rhinitis     controlled w otc claritin    Allergy to environmental factors     \"use to take allergy shots none for the past 15 yrs'    Asthma     \"few years ago\"    Elevated blood pressure (not hypertension)     \"never been on b/p medication\"    Hearing loss of left ear     \"in the process of getting a hearing aide    History of kidney stones     \"had them twice in the past- last time over 10 yrs ago- passed them\"    Hx MRSA infection     2012    Hyperlipidemia     Left Achilles tendinitis     Nephrolithiasis     early 1980s, passed w/o surgery    S/P colonoscopy 1/2014    Dr Javon Chang- severe diverticulosis- recheck 10 yrs       Surgical history :   Past Surgical History:   Procedure Laterality Date    ABSCESS DRAINAGE Right 10/12    Dr Cara Gibbons- R thumb MRSA infection       Family history:   Family History   Problem Relation Age of Onset    Kidney Disease Father         in his 25s    Early Death Father     Heart Disease Mother     Hypertension Mother     Heart Disease Son         irreg heart beat    Asthma Maternal Uncle        Social history :  reports that he has never smoked. He has never used smokeless tobacco. He reports current alcohol use.  He reports that he does not use drugs. Allergies   Allergen Reactions    Pollen Extract Shortness Of Breath       Current Outpatient Medications on File Prior to Visit   Medication Sig Dispense Refill    atorvastatin (LIPITOR) 10 MG tablet TAKE 1 TABLET BY MOUTH ONE TIME A DAY  (Patient not taking: No sig reported) 30 tablet 5     No current facility-administered medications on file prior to visit. Review of Systems:   Review of Systems   Constitutional:  Negative for activity change, chills, fatigue and fever. HENT:  Negative for congestion, ear pain and tinnitus. Eyes:  Negative for photophobia, pain and visual disturbance. Respiratory:  Negative for cough, chest tightness, shortness of breath and wheezing. Cardiovascular:  Negative for chest pain, palpitations and leg swelling. Gastrointestinal:  Negative for abdominal pain, blood in stool, constipation, diarrhea, nausea and vomiting. Endocrine: Negative for cold intolerance and heat intolerance. Genitourinary:  Negative for dysuria, flank pain and hematuria. Musculoskeletal:  Negative for arthralgias, back pain, myalgias and neck stiffness. Skin:  Negative for color change and rash. Allergic/Immunologic: Negative for food allergies. Neurological:  Negative for dizziness, light-headedness, numbness and headaches. Hematological:  Does not bruise/bleed easily. Psychiatric/Behavioral:  Negative for agitation, behavioral problems and confusion. Examination:      Vitals:    09/14/22 1355   BP: 136/80   Site: Right Upper Arm   Position: Sitting   Cuff Size: Large Adult   Weight: 236 lb 9.6 oz (107.3 kg)   Height: 6' (1.829 m)        Body mass index is 32.09 kg/m². Physical Exam  Constitutional:       Appearance: Normal appearance. He is not ill-appearing. HENT:      Head: Normocephalic and atraumatic.       Mouth/Throat:      Mouth: Mucous membranes are moist.   Eyes:      Conjunctiva/sclera: Conjunctivae normal.   Cardiovascular:      Rate and Rhythm: Normal rate and regular rhythm. Heart sounds: No murmur heard. Pulmonary:      Effort: Pulmonary effort is normal.      Breath sounds: No rales. Abdominal:      General: Abdomen is flat. Palpations: Abdomen is soft. Musculoskeletal:         General: No tenderness. Normal range of motion. Cervical back: Normal range of motion. Right lower leg: No edema. Left lower leg: No edema. Skin:     General: Skin is warm and dry. Neurological:      General: No focal deficit present. Mental Status: He is alert and oriented to person, place, and time. CBC:   Lab Results   Component Value Date/Time    WBC 5.4 07/14/2022 06:09 AM    HGB 13.5 07/14/2022 06:09 AM    HCT 40.2 07/14/2022 06:09 AM     07/14/2022 06:09 AM     Lipids:  Lab Results   Component Value Date    CHOL 181 07/12/2022    TRIG 181 (H) 07/12/2022    HDL 19 (L) 07/12/2022    LDLCALC 126 (H) 07/12/2022     PT/INR: No results found for: INR     BMP:    Lab Results   Component Value Date     07/14/2022    K 3.7 07/14/2022     07/14/2022    CO2 23 07/14/2022    BUN 9 07/14/2022     CMP:   Lab Results   Component Value Date     (H) 07/12/2022    PROT 5.6 (L) 07/12/2022    BILITOT 0.9 07/12/2022    ALKPHOS 57 07/12/2022     TSH:    Lab Results   Component Value Date/Time    TSH 1.7 10/30/2014 04:36 PM       EKGINTERPRETATION - EKG Interpretation:  sinus rhythm      IMPRESSION / RECOMMENDATIONS:     Syncope  SVT  Asthma  HLA      Patient had Syncopal episode and was in the hospital and had work-up done and there was negative and patient was placed on an event monitor. Patient had episodes of SVT on the monitor but patient is convinced that he had SVT only when he was stressing and he is not having any symptoms currently and he is fine not want to have any procedures. Discussed with the patient pathophysiology of SVTs in detail.   Patient wants open to considering medical therapy but no procedures. We will start him on Cardizem 120 mg daily and aspirin 81 mg daily. If he continues to have symptoms recommended to call immediately. Otherwise we will follow in 3 to 6 months    Discussed with patient the risk of repeat syncope and arrhythmia. Patient voiced understanding and wants to wait on any procedures at this time    Thanks again for allowing me to participate in care of this patient. Please call me if you have any questions. With best regards. Kim Flores MD, 9/14/2022 2:11 PM     Please note this report has been partially produced using speech recognition software and may contain errors related to that system including errors in grammar, punctuation, and spelling, as well as words and phrases that may be inappropriate. If there are any questions or concerns please feel free to contact the dictating provider for clarification.

## 2022-10-09 ASSESSMENT — ENCOUNTER SYMPTOMS
WHEEZING: 0
PHOTOPHOBIA: 0
EYE PAIN: 0
SHORTNESS OF BREATH: 0
CONSTIPATION: 0
CHEST TIGHTNESS: 0
DIARRHEA: 0
VOMITING: 0
BACK PAIN: 0
BLOOD IN STOOL: 0
NAUSEA: 0
COUGH: 0
ABDOMINAL PAIN: 0
COLOR CHANGE: 0

## 2022-12-20 ENCOUNTER — TELEPHONE (OUTPATIENT)
Dept: CARDIOLOGY CLINIC | Age: 72
End: 2022-12-20

## 2022-12-22 ENCOUNTER — OFFICE VISIT (OUTPATIENT)
Dept: CARDIOLOGY CLINIC | Age: 72
End: 2022-12-22
Payer: MEDICARE

## 2022-12-22 VITALS
HEART RATE: 68 BPM | SYSTOLIC BLOOD PRESSURE: 138 MMHG | DIASTOLIC BLOOD PRESSURE: 86 MMHG | BODY MASS INDEX: 33.05 KG/M2 | HEIGHT: 72 IN | WEIGHT: 244 LBS

## 2022-12-22 DIAGNOSIS — I47.1 SVT (SUPRAVENTRICULAR TACHYCARDIA) (HCC): Primary | ICD-10-CM

## 2022-12-22 PROCEDURE — 93000 ELECTROCARDIOGRAM COMPLETE: CPT | Performed by: INTERNAL MEDICINE

## 2022-12-22 PROCEDURE — 1123F ACP DISCUSS/DSCN MKR DOCD: CPT | Performed by: INTERNAL MEDICINE

## 2022-12-22 PROCEDURE — 99213 OFFICE O/P EST LOW 20 MIN: CPT | Performed by: INTERNAL MEDICINE

## 2022-12-22 ASSESSMENT — ENCOUNTER SYMPTOMS
BLOOD IN STOOL: 0
WHEEZING: 0
COLOR CHANGE: 0
NAUSEA: 0
BACK PAIN: 0
ABDOMINAL PAIN: 0
DIARRHEA: 0
CHEST TIGHTNESS: 0
COUGH: 0
VOMITING: 0
PHOTOPHOBIA: 0
CONSTIPATION: 0
SHORTNESS OF BREATH: 0
EYE PAIN: 0

## 2022-12-22 NOTE — PROGRESS NOTES
Electrophysiology FU Note      Reason for consultation:  SVT    Chief complaint : Dizziness and tired with cardizem so stopped taking it. Referring physician: Tariq Casas      Primary care physician: No primary care provider on file. History of Present Illness: This visit (12/22/2022)      Chief Complaint   Patient presents with    3 Myra Aliza     Dr. Mesfin Cuevas patient here for SVT, Patient states when he was taking cardizem it made him dizzy and shaky, he is no longer taking it. Patient has no cardiac complaints. Patient drinks 2-3 beers a week and does not smoke. Patient drinks coffee every morning. Previous visit:     Patient is 67 yr old male with hx of asthma, hyperlipidemia referred by  for SVT management. Patient reports palpitations before but on medication is feeling better. Patient denies palpitations now. He was in the hospital before but now is doing good with medication. Denies chest pain, shortness of breath, palpitations, syncope or presyncope, edema.  Denies smoking and denies palpitations    Pastmedical history:   Past Medical History:   Diagnosis Date    Allergic rhinitis     controlled w otc claritin    Allergy to environmental factors     \"use to take allergy shots none for the past 15 yrs'    Asthma     \"few years ago\"    Elevated blood pressure (not hypertension)     \"never been on b/p medication\"    Hearing loss of left ear     \"in the process of getting a hearing aide    History of kidney stones     \"had them twice in the past- last time over 10 yrs ago- passed them\"    Hx MRSA infection     2012    Hyperlipidemia     Left Achilles tendinitis     Nephrolithiasis     early 1980s, passed w/o surgery    S/P colonoscopy 1/2014    Dr Bao Stevens- severe diverticulosis- recheck 10 yrs       Surgical history :   Past Surgical History:   Procedure Laterality Date    ABSCESS DRAINAGE Right 10/12    Dr Shiloh Bojorquez- R thumb MRSA infection Family history:   Family History   Problem Relation Age of Onset    Kidney Disease Father         in his 25s    Early Death Father     Heart Disease Mother     Hypertension Mother     Heart Disease Son         irreg heart beat    Asthma Maternal Uncle        Social history :  reports that he has never smoked. He has never used smokeless tobacco. He reports current alcohol use. He reports that he does not use drugs. Allergies   Allergen Reactions    Pollen Extract Shortness Of Breath       Current Outpatient Medications on File Prior to Visit   Medication Sig Dispense Refill    aspirin EC 81 MG EC tablet Take 1 tablet by mouth daily 90 tablet 1    dilTIAZem (CARDIZEM CD) 120 MG extended release capsule Take 1 capsule by mouth daily (Patient not taking: Reported on 12/22/2022) 30 capsule 3    atorvastatin (LIPITOR) 10 MG tablet TAKE 1 TABLET BY MOUTH ONE TIME A DAY  (Patient not taking: No sig reported) 30 tablet 5     No current facility-administered medications on file prior to visit. Review of Systems:   Review of Systems   Constitutional:  Negative for activity change, chills, fatigue and fever. HENT:  Negative for congestion, ear pain and tinnitus. Eyes:  Negative for photophobia, pain and visual disturbance. Respiratory:  Negative for cough, chest tightness, shortness of breath and wheezing. Cardiovascular:  Negative for chest pain, palpitations and leg swelling. Gastrointestinal:  Negative for abdominal pain, blood in stool, constipation, diarrhea, nausea and vomiting. Endocrine: Negative for cold intolerance and heat intolerance. Genitourinary:  Negative for dysuria, flank pain and hematuria. Musculoskeletal:  Negative for arthralgias, back pain, myalgias and neck stiffness. Skin:  Negative for color change and rash. Allergic/Immunologic: Negative for food allergies. Neurological:  Negative for dizziness, light-headedness, numbness and headaches.    Hematological:  Does not bruise/bleed easily. Psychiatric/Behavioral:  Negative for agitation, behavioral problems and confusion. Examination:      Vitals:    12/22/22 0950   BP: 138/86   Site: Left Upper Arm   Position: Sitting   Cuff Size: Large Adult   Pulse: 68   Weight: 244 lb (110.7 kg)   Height: 6' (1.829 m)        Body mass index is 33.09 kg/m². Physical Exam  Constitutional:       Appearance: Normal appearance. He is not ill-appearing. HENT:      Head: Normocephalic and atraumatic. Mouth/Throat:      Mouth: Mucous membranes are moist.   Eyes:      Conjunctiva/sclera: Conjunctivae normal.   Cardiovascular:      Rate and Rhythm: Normal rate and regular rhythm. Heart sounds: No murmur heard. Pulmonary:      Effort: Pulmonary effort is normal.      Breath sounds: No rales. Abdominal:      General: Abdomen is flat. Palpations: Abdomen is soft. Musculoskeletal:         General: No tenderness. Normal range of motion. Cervical back: Normal range of motion. Right lower leg: No edema. Left lower leg: No edema. Skin:     General: Skin is warm and dry. Neurological:      General: No focal deficit present. Mental Status: He is alert and oriented to person, place, and time.              CBC:   Lab Results   Component Value Date/Time    WBC 5.4 07/14/2022 06:09 AM    HGB 13.5 07/14/2022 06:09 AM    HCT 40.2 07/14/2022 06:09 AM     07/14/2022 06:09 AM     Lipids:  Lab Results   Component Value Date    CHOL 181 07/12/2022    TRIG 181 (H) 07/12/2022    HDL 19 (L) 07/12/2022    LDLCALC 126 (H) 07/12/2022     PT/INR: No results found for: INR     BMP:    Lab Results   Component Value Date     07/14/2022    K 3.7 07/14/2022     07/14/2022    CO2 23 07/14/2022    BUN 9 07/14/2022     CMP:   Lab Results   Component Value Date     (H) 07/12/2022    PROT 5.6 (L) 07/12/2022    BILITOT 0.9 07/12/2022    ALKPHOS 57 07/12/2022     TSH:    Lab Results   Component Value Date/Time    TSH 1.7 10/30/2014 04:36 PM       EKGINTERPRETATION - EKG Interpretation:  sinus rhythm      IMPRESSION / RECOMMENDATIONS:     Syncope  SVT  Asthma  HLD on lipitor    Patient stopped cardizem as he did not tolerate made him tired he report  Discusued EP study and patient does not want it.    He wants to think about it  Discussed about other SVT and also atrial fibrillation   Patient wants to think and let us know  He is currently only on aspirin    Thanks again for allowing me to participate in care of this patient. Please call me if you have any questions.    With best regards.      Sixto Hu MD, 12/22/2022 10:05 AM     Please note this report has been partially produced using speech recognition software and may contain errors related to that system including errors in grammar, punctuation, and spelling, as well as words and phrases that may be inappropriate. If there are any questions or concerns please feel free to contact the dictating provider for clarification.

## 2023-03-22 ENCOUNTER — TELEPHONE (OUTPATIENT)
Dept: CARDIOLOGY CLINIC | Age: 73
End: 2023-03-22

## 2024-05-29 LAB
ALBUMIN SERPL-MCNC: 4.4 G/DL
ALP BLD-CCNC: 82 U/L
ALT SERPL-CCNC: 30 U/L
ANION GAP SERPL CALCULATED.3IONS-SCNC: NORMAL MMOL/L
AST SERPL-CCNC: 33 U/L
BASOPHILS ABSOLUTE: 0.1 /ΜL
BASOPHILS RELATIVE PERCENT: 1 %
BILIRUB SERPL-MCNC: 0.6 MG/DL (ref 0.1–1.4)
CALCIUM SERPL-MCNC: 9.4 MG/DL
CHLORIDE BLD-SCNC: 23 MMOL/L
CHOLESTEROL, TOTAL: 236 MG/DL
CHOLESTEROL/HDL RATIO: ABNORMAL
CO2: 23 MMOL/L
CREAT SERPL-MCNC: 1.02 MG/DL
EGFR: NORMAL
EOSINOPHILS ABSOLUTE: 0.2 /ΜL
EOSINOPHILS RELATIVE PERCENT: 3.7 %
GLUCOSE BLD-MCNC: 97 MG/DL
HCT VFR BLD CALC: 46 % (ref 41–53)
HDLC SERPL-MCNC: 31 MG/DL (ref 35–70)
HEMOGLOBIN: 15.5 G/DL (ref 13.5–17.5)
LDL CHOLESTEROL CALCULATED: 171 MG/DL (ref 0–160)
LYMPHOCYTES ABSOLUTE: 1.5 /ΜL
LYMPHOCYTES RELATIVE PERCENT: 31.2 %
MCH RBC QN AUTO: 28.7 PG
MCHC RBC AUTO-ENTMCNC: 33.7 G/DL
MCV RBC AUTO: 85 FL
MONOCYTES ABSOLUTE: 0.5 /ΜL
MONOCYTES RELATIVE PERCENT: 9.4 %
NEUTROPHILS ABSOLUTE: 2.7 /ΜL
NEUTROPHILS RELATIVE PERCENT: 54.5 %
NONHDLC SERPL-MCNC: ABNORMAL MG/DL
PLATELET # BLD: 167 K/ΜL
PMV BLD AUTO: 12.6 FL
POTASSIUM SERPL-SCNC: 4.5 MMOL/L
RBC # BLD: 5.41 10^6/ΜL
SODIUM BLD-SCNC: 142 MMOL/L
TOTAL PROTEIN: 7.1
TRIGL SERPL-MCNC: 172 MG/DL
TSH SERPL DL<=0.05 MIU/L-ACNC: 2.23 UIU/ML
VLDLC SERPL CALC-MCNC: 34 MG/DL
WBC # BLD: 4.9 10^3/ML